# Patient Record
Sex: FEMALE | Race: BLACK OR AFRICAN AMERICAN | NOT HISPANIC OR LATINO | URBAN - METROPOLITAN AREA
[De-identification: names, ages, dates, MRNs, and addresses within clinical notes are randomized per-mention and may not be internally consistent; named-entity substitution may affect disease eponyms.]

---

## 2022-01-01 ENCOUNTER — OUTPATIENT (OUTPATIENT)
Dept: OUTPATIENT SERVICES | Facility: HOSPITAL | Age: 82
LOS: 1 days | Discharge: HOME | End: 2022-01-01

## 2022-01-01 ENCOUNTER — INPATIENT (INPATIENT)
Facility: HOSPITAL | Age: 82
LOS: 1 days | End: 2022-10-25
Attending: INTERNAL MEDICINE | Admitting: INTERNAL MEDICINE

## 2022-01-01 ENCOUNTER — INPATIENT (INPATIENT)
Facility: HOSPITAL | Age: 82
LOS: 3 days | Discharge: HOME | End: 2022-09-30
Attending: INTERNAL MEDICINE | Admitting: INTERNAL MEDICINE
Payer: COMMERCIAL

## 2022-01-01 VITALS — HEART RATE: 150 BPM | RESPIRATION RATE: 50 BRPM | WEIGHT: 89.95 LBS | HEIGHT: 60 IN | TEMPERATURE: 97 F

## 2022-01-01 VITALS
HEART RATE: 116 BPM | RESPIRATION RATE: 18 BRPM | TEMPERATURE: 98 F | DIASTOLIC BLOOD PRESSURE: 68 MMHG | OXYGEN SATURATION: 98 % | SYSTOLIC BLOOD PRESSURE: 104 MMHG

## 2022-01-01 VITALS
TEMPERATURE: 98 F | HEART RATE: 90 BPM | DIASTOLIC BLOOD PRESSURE: 57 MMHG | OXYGEN SATURATION: 98 % | RESPIRATION RATE: 20 BRPM | WEIGHT: 100.09 LBS | SYSTOLIC BLOOD PRESSURE: 87 MMHG | HEIGHT: 60 IN

## 2022-01-01 VITALS — RESPIRATION RATE: 30 BRPM | SYSTOLIC BLOOD PRESSURE: 45 MMHG

## 2022-01-01 DIAGNOSIS — I82.431 ACUTE EMBOLISM AND THROMBOSIS OF RIGHT POPLITEAL VEIN: ICD-10-CM

## 2022-01-01 DIAGNOSIS — I82.411 ACUTE EMBOLISM AND THROMBOSIS OF RIGHT FEMORAL VEIN: ICD-10-CM

## 2022-01-01 DIAGNOSIS — R53.81 OTHER MALAISE: ICD-10-CM

## 2022-01-01 DIAGNOSIS — Z23 ENCOUNTER FOR IMMUNIZATION: ICD-10-CM

## 2022-01-01 DIAGNOSIS — M89.8X8 OTHER SPECIFIED DISORDERS OF BONE, OTHER SITE: ICD-10-CM
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
## 2022-01-01 DIAGNOSIS — Z51.5 ENCOUNTER FOR PALLIATIVE CARE: ICD-10-CM

## 2022-01-01 DIAGNOSIS — M84.451D PATHOLOGICAL FRACTURE, RIGHT FEMUR, SUBSEQUENT ENCOUNTER FOR FRACTURE WITH ROUTINE HEALING: ICD-10-CM

## 2022-01-01 DIAGNOSIS — N93.9 ABNORMAL UTERINE AND VAGINAL BLEEDING, UNSPECIFIED: ICD-10-CM

## 2022-01-01 DIAGNOSIS — I10 ESSENTIAL (PRIMARY) HYPERTENSION: ICD-10-CM

## 2022-01-01 DIAGNOSIS — M79.661 PAIN IN RIGHT LOWER LEG: ICD-10-CM

## 2022-01-01 DIAGNOSIS — E43 UNSPECIFIED SEVERE PROTEIN-CALORIE MALNUTRITION: ICD-10-CM

## 2022-01-01 DIAGNOSIS — D63.8 ANEMIA IN OTHER CHRONIC DISEASES CLASSIFIED ELSEWHERE: ICD-10-CM

## 2022-01-01 DIAGNOSIS — R06.02 SHORTNESS OF BREATH: ICD-10-CM

## 2022-01-01 DIAGNOSIS — I26.99 OTHER PULMONARY EMBOLISM WITHOUT ACUTE COR PULMONALE: ICD-10-CM

## 2022-01-01 DIAGNOSIS — K59.00 CONSTIPATION, UNSPECIFIED: ICD-10-CM

## 2022-01-01 DIAGNOSIS — R62.7 ADULT FAILURE TO THRIVE: ICD-10-CM

## 2022-01-01 DIAGNOSIS — M17.11 UNILATERAL PRIMARY OSTEOARTHRITIS, RIGHT KNEE: ICD-10-CM

## 2022-01-01 LAB
24R-OH-CALCIDIOL SERPL-MCNC: 62.4 NG/ML — SIGNIFICANT CHANGE UP (ref 30–80)
ALBUMIN SERPL ELPH-MCNC: 0.9 G/DL — LOW (ref 3.3–5)
ALBUMIN SERPL ELPH-MCNC: 1.1 G/DL — LOW (ref 3.3–5)
ALBUMIN SERPL ELPH-MCNC: 1.3 G/DL — LOW (ref 3.3–5)
ALBUMIN SERPL ELPH-MCNC: 1.5 G/DL — LOW (ref 3.3–5)
ALP SERPL-CCNC: 109 U/L — SIGNIFICANT CHANGE UP (ref 40–120)
ALP SERPL-CCNC: 80 U/L — SIGNIFICANT CHANGE UP (ref 40–120)
ALP SERPL-CCNC: 92 U/L — SIGNIFICANT CHANGE UP (ref 40–120)
ALP SERPL-CCNC: 93 U/L — SIGNIFICANT CHANGE UP (ref 40–120)
ALT FLD-CCNC: 15 U/L — SIGNIFICANT CHANGE UP (ref 12–78)
ALT FLD-CCNC: 20 U/L — SIGNIFICANT CHANGE UP (ref 12–78)
ANION GAP SERPL CALC-SCNC: 4 MMOL/L — LOW (ref 5–17)
ANION GAP SERPL CALC-SCNC: 7 MMOL/L — SIGNIFICANT CHANGE UP (ref 5–17)
ANION GAP SERPL CALC-SCNC: 8 MMOL/L — SIGNIFICANT CHANGE UP (ref 5–17)
ANION GAP SERPL CALC-SCNC: 8 MMOL/L — SIGNIFICANT CHANGE UP (ref 5–17)
ANISOCYTOSIS BLD QL: SLIGHT — SIGNIFICANT CHANGE UP
APTT BLD: 30.2 SEC — SIGNIFICANT CHANGE UP (ref 27.5–35.5)
APTT BLD: 33.2 SEC — SIGNIFICANT CHANGE UP (ref 27.5–35.5)
APTT BLD: 34.2 SEC — SIGNIFICANT CHANGE UP (ref 27.5–35.5)
APTT BLD: 51.9 SEC — HIGH (ref 27.5–35.5)
APTT BLD: 56.4 SEC — HIGH (ref 27.5–35.5)
APTT BLD: 65.7 SEC — HIGH (ref 27.5–35.5)
APTT BLD: 72.9 SEC — HIGH (ref 27.5–35.5)
AST SERPL-CCNC: 19 U/L — SIGNIFICANT CHANGE UP (ref 15–37)
AST SERPL-CCNC: 19 U/L — SIGNIFICANT CHANGE UP (ref 15–37)
AST SERPL-CCNC: 21 U/L — SIGNIFICANT CHANGE UP (ref 15–37)
AST SERPL-CCNC: 26 U/L — SIGNIFICANT CHANGE UP (ref 15–37)
BASOPHILS # BLD AUTO: 0 K/UL — SIGNIFICANT CHANGE UP (ref 0–0.2)
BASOPHILS # BLD AUTO: 0.03 K/UL — SIGNIFICANT CHANGE UP (ref 0–0.2)
BASOPHILS NFR BLD AUTO: 0 % — SIGNIFICANT CHANGE UP (ref 0–2)
BASOPHILS NFR BLD AUTO: 0.5 % — SIGNIFICANT CHANGE UP (ref 0–2)
BILIRUB SERPL-MCNC: 0.5 MG/DL — SIGNIFICANT CHANGE UP (ref 0.2–1.2)
BILIRUB SERPL-MCNC: 0.7 MG/DL — SIGNIFICANT CHANGE UP (ref 0.2–1.2)
BLD GP AB SCN SERPL QL: SIGNIFICANT CHANGE UP
BLD GP AB SCN SERPL QL: SIGNIFICANT CHANGE UP
BUN SERPL-MCNC: 11 MG/DL — SIGNIFICANT CHANGE UP (ref 7–23)
BUN SERPL-MCNC: 15 MG/DL — SIGNIFICANT CHANGE UP (ref 7–23)
BUN SERPL-MCNC: 38 MG/DL — HIGH (ref 7–23)
BUN SERPL-MCNC: 44 MG/DL — HIGH (ref 7–23)
BURR CELLS BLD QL SMEAR: PRESENT — SIGNIFICANT CHANGE UP
CALCIUM SERPL-MCNC: 7.7 MG/DL — LOW (ref 8.5–10.1)
CALCIUM SERPL-MCNC: 8 MG/DL — LOW (ref 8.5–10.1)
CALCIUM SERPL-MCNC: 8.4 MG/DL — LOW (ref 8.5–10.1)
CALCIUM SERPL-MCNC: 8.7 MG/DL — SIGNIFICANT CHANGE UP (ref 8.5–10.1)
CHLORIDE SERPL-SCNC: 108 MMOL/L — SIGNIFICANT CHANGE UP (ref 96–108)
CHLORIDE SERPL-SCNC: 111 MMOL/L — HIGH (ref 96–108)
CHLORIDE SERPL-SCNC: 120 MMOL/L — HIGH (ref 96–108)
CHLORIDE SERPL-SCNC: 125 MMOL/L — HIGH (ref 96–108)
CO2 SERPL-SCNC: 21 MMOL/L — LOW (ref 22–31)
CO2 SERPL-SCNC: 24 MMOL/L — SIGNIFICANT CHANGE UP (ref 22–31)
CO2 SERPL-SCNC: 26 MMOL/L — SIGNIFICANT CHANGE UP (ref 22–31)
CO2 SERPL-SCNC: 30 MMOL/L — SIGNIFICANT CHANGE UP (ref 22–31)
CREAT SERPL-MCNC: 0.56 MG/DL — SIGNIFICANT CHANGE UP (ref 0.5–1.3)
CREAT SERPL-MCNC: 0.67 MG/DL — SIGNIFICANT CHANGE UP (ref 0.5–1.3)
CREAT SERPL-MCNC: 0.72 MG/DL — SIGNIFICANT CHANGE UP (ref 0.5–1.3)
CREAT SERPL-MCNC: 0.78 MG/DL — SIGNIFICANT CHANGE UP (ref 0.5–1.3)
EGFR: 75 ML/MIN/1.73M2 — SIGNIFICANT CHANGE UP
EGFR: 82 ML/MIN/1.73M2 — SIGNIFICANT CHANGE UP
EGFR: 86 ML/MIN/1.73M2 — SIGNIFICANT CHANGE UP
EGFR: 90 ML/MIN/1.73M2 — SIGNIFICANT CHANGE UP
EOSINOPHIL # BLD AUTO: 0 K/UL — SIGNIFICANT CHANGE UP (ref 0–0.5)
EOSINOPHIL # BLD AUTO: 0.02 K/UL — SIGNIFICANT CHANGE UP (ref 0–0.5)
EOSINOPHIL NFR BLD AUTO: 0 % — SIGNIFICANT CHANGE UP (ref 0–6)
EOSINOPHIL NFR BLD AUTO: 0.3 % — SIGNIFICANT CHANGE UP (ref 0–6)
FLUAV AG NPH QL: SIGNIFICANT CHANGE UP
FLUBV AG NPH QL: SIGNIFICANT CHANGE UP
FOLATE SERPL-MCNC: 12.6 NG/ML — SIGNIFICANT CHANGE UP
GLUCOSE BLDC GLUCOMTR-MCNC: 97 MG/DL — SIGNIFICANT CHANGE UP (ref 70–99)
GLUCOSE SERPL-MCNC: 100 MG/DL — HIGH (ref 70–99)
GLUCOSE SERPL-MCNC: 129 MG/DL — HIGH (ref 70–99)
GLUCOSE SERPL-MCNC: 139 MG/DL — HIGH (ref 70–99)
GLUCOSE SERPL-MCNC: 64 MG/DL — LOW (ref 70–99)
HCT VFR BLD CALC: 20.3 % — CRITICAL LOW (ref 34.5–45)
HCT VFR BLD CALC: 21.4 % — LOW (ref 34.5–45)
HCT VFR BLD CALC: 27.6 % — LOW (ref 34.5–45)
HCT VFR BLD CALC: 27.8 % — LOW (ref 34.5–45)
HCT VFR BLD CALC: 29 % — LOW (ref 34.5–45)
HCT VFR BLD CALC: 29.1 % — LOW (ref 34.5–45)
HCT VFR BLD CALC: 30.5 % — LOW (ref 34.5–45)
HCT VFR BLD CALC: 31.2 % — LOW (ref 34.5–45)
HCT VFR BLD CALC: 33.3 % — LOW (ref 34.5–45)
HGB BLD-MCNC: 10.2 G/DL — LOW (ref 11.5–15.5)
HGB BLD-MCNC: 6.3 G/DL — CRITICAL LOW (ref 11.5–15.5)
HGB BLD-MCNC: 6.4 G/DL — CRITICAL LOW (ref 11.5–15.5)
HGB BLD-MCNC: 8.6 G/DL — LOW (ref 11.5–15.5)
HGB BLD-MCNC: 8.7 G/DL — LOW (ref 11.5–15.5)
HGB BLD-MCNC: 9 G/DL — LOW (ref 11.5–15.5)
HGB BLD-MCNC: 9.1 G/DL — LOW (ref 11.5–15.5)
HGB BLD-MCNC: 9.6 G/DL — LOW (ref 11.5–15.5)
HGB BLD-MCNC: 9.7 G/DL — LOW (ref 11.5–15.5)
IMM GRANULOCYTES NFR BLD AUTO: 0.5 % — SIGNIFICANT CHANGE UP (ref 0–0.9)
INR BLD: 1.14 RATIO — SIGNIFICANT CHANGE UP (ref 0.88–1.16)
INR BLD: 1.67 RATIO — HIGH (ref 0.88–1.16)
IRON SATN MFR SERPL: 18 % — SIGNIFICANT CHANGE UP (ref 14–50)
IRON SATN MFR SERPL: 19 UG/DL — LOW (ref 30–160)
LDH SERPL L TO P-CCNC: 285 U/L — HIGH (ref 50–242)
LYMPHOCYTES # BLD AUTO: 1.74 K/UL — SIGNIFICANT CHANGE UP (ref 1–3.3)
LYMPHOCYTES # BLD AUTO: 15 % — SIGNIFICANT CHANGE UP (ref 13–44)
LYMPHOCYTES # BLD AUTO: 2.07 K/UL — SIGNIFICANT CHANGE UP (ref 1–3.3)
LYMPHOCYTES # BLD AUTO: 29 % — SIGNIFICANT CHANGE UP (ref 13–44)
MACROCYTES BLD QL: SLIGHT — SIGNIFICANT CHANGE UP
MAGNESIUM SERPL-MCNC: 2 MG/DL — SIGNIFICANT CHANGE UP (ref 1.6–2.6)
MANUAL SMEAR VERIFICATION: SIGNIFICANT CHANGE UP
MCHC RBC-ENTMCNC: 28.5 PG — SIGNIFICANT CHANGE UP (ref 27–34)
MCHC RBC-ENTMCNC: 28.7 PG — SIGNIFICANT CHANGE UP (ref 27–34)
MCHC RBC-ENTMCNC: 28.7 PG — SIGNIFICANT CHANGE UP (ref 27–34)
MCHC RBC-ENTMCNC: 28.8 PG — SIGNIFICANT CHANGE UP (ref 27–34)
MCHC RBC-ENTMCNC: 29 PG — SIGNIFICANT CHANGE UP (ref 27–34)
MCHC RBC-ENTMCNC: 29 PG — SIGNIFICANT CHANGE UP (ref 27–34)
MCHC RBC-ENTMCNC: 29.1 PG — SIGNIFICANT CHANGE UP (ref 27–34)
MCHC RBC-ENTMCNC: 29.1 PG — SIGNIFICANT CHANGE UP (ref 27–34)
MCHC RBC-ENTMCNC: 29.4 PG — SIGNIFICANT CHANGE UP (ref 27–34)
MCHC RBC-ENTMCNC: 29.9 G/DL — LOW (ref 32–36)
MCHC RBC-ENTMCNC: 30.6 G/DL — LOW (ref 32–36)
MCHC RBC-ENTMCNC: 30.8 G/DL — LOW (ref 32–36)
MCHC RBC-ENTMCNC: 30.9 G/DL — LOW (ref 32–36)
MCHC RBC-ENTMCNC: 31 G/DL — LOW (ref 32–36)
MCHC RBC-ENTMCNC: 31 G/DL — LOW (ref 32–36)
MCHC RBC-ENTMCNC: 31.3 G/DL — LOW (ref 32–36)
MCHC RBC-ENTMCNC: 31.5 G/DL — LOW (ref 32–36)
MCHC RBC-ENTMCNC: 31.8 G/DL — LOW (ref 32–36)
MCV RBC AUTO: 92.1 FL — SIGNIFICANT CHANGE UP (ref 80–100)
MCV RBC AUTO: 92.3 FL — SIGNIFICANT CHANGE UP (ref 80–100)
MCV RBC AUTO: 92.4 FL — SIGNIFICANT CHANGE UP (ref 80–100)
MCV RBC AUTO: 92.4 FL — SIGNIFICANT CHANGE UP (ref 80–100)
MCV RBC AUTO: 93 FL — SIGNIFICANT CHANGE UP (ref 80–100)
MCV RBC AUTO: 93.5 FL — SIGNIFICANT CHANGE UP (ref 80–100)
MCV RBC AUTO: 93.7 FL — SIGNIFICANT CHANGE UP (ref 80–100)
MCV RBC AUTO: 93.8 FL — SIGNIFICANT CHANGE UP (ref 80–100)
MCV RBC AUTO: 96.8 FL — SIGNIFICANT CHANGE UP (ref 80–100)
MICROCYTES BLD QL: SLIGHT — SIGNIFICANT CHANGE UP
MONOCYTES # BLD AUTO: 0.28 K/UL — SIGNIFICANT CHANGE UP (ref 0–0.9)
MONOCYTES # BLD AUTO: 0.58 K/UL — SIGNIFICANT CHANGE UP (ref 0–0.9)
MONOCYTES NFR BLD AUTO: 2 % — SIGNIFICANT CHANGE UP (ref 2–14)
MONOCYTES NFR BLD AUTO: 9.7 % — SIGNIFICANT CHANGE UP (ref 2–14)
NEUTROPHILS # BLD AUTO: 11.45 K/UL — HIGH (ref 1.8–7.4)
NEUTROPHILS # BLD AUTO: 3.6 K/UL — SIGNIFICANT CHANGE UP (ref 1.8–7.4)
NEUTROPHILS NFR BLD AUTO: 60 % — SIGNIFICANT CHANGE UP (ref 43–77)
NEUTROPHILS NFR BLD AUTO: 74 % — SIGNIFICANT CHANGE UP (ref 43–77)
NEUTS BAND # BLD: 9 % — HIGH (ref 0–8)
NRBC # BLD: 0 /100 WBCS — SIGNIFICANT CHANGE UP (ref 0–0)
NRBC # BLD: 0 /100 — SIGNIFICANT CHANGE UP (ref 0–0)
NRBC # BLD: SIGNIFICANT CHANGE UP /100 WBCS (ref 0–0)
OVALOCYTES BLD QL SMEAR: SLIGHT — SIGNIFICANT CHANGE UP
PHOSPHATE SERPL-MCNC: 2.7 MG/DL — SIGNIFICANT CHANGE UP (ref 2.5–4.5)
PLAT MORPH BLD: NORMAL — SIGNIFICANT CHANGE UP
PLATELET # BLD AUTO: 122 K/UL — LOW (ref 150–400)
PLATELET # BLD AUTO: 168 K/UL — SIGNIFICANT CHANGE UP (ref 150–400)
PLATELET # BLD AUTO: 174 K/UL — SIGNIFICANT CHANGE UP (ref 150–400)
PLATELET # BLD AUTO: 176 K/UL — SIGNIFICANT CHANGE UP (ref 150–400)
PLATELET # BLD AUTO: 178 K/UL — SIGNIFICANT CHANGE UP (ref 150–400)
PLATELET # BLD AUTO: 182 K/UL — SIGNIFICANT CHANGE UP (ref 150–400)
PLATELET # BLD AUTO: 184 K/UL — SIGNIFICANT CHANGE UP (ref 150–400)
PLATELET # BLD AUTO: 195 K/UL — SIGNIFICANT CHANGE UP (ref 150–400)
PLATELET # BLD AUTO: 99 K/UL — LOW (ref 150–400)
PLATELET CLUMP BLD QL SMEAR: SLIGHT
POIKILOCYTOSIS BLD QL AUTO: SLIGHT — SIGNIFICANT CHANGE UP
POLYCHROMASIA BLD QL SMEAR: SLIGHT — SIGNIFICANT CHANGE UP
POTASSIUM SERPL-MCNC: 3.6 MMOL/L — SIGNIFICANT CHANGE UP (ref 3.5–5.3)
POTASSIUM SERPL-MCNC: 3.9 MMOL/L — SIGNIFICANT CHANGE UP (ref 3.5–5.3)
POTASSIUM SERPL-MCNC: 4.1 MMOL/L — SIGNIFICANT CHANGE UP (ref 3.5–5.3)
POTASSIUM SERPL-MCNC: 4.3 MMOL/L — SIGNIFICANT CHANGE UP (ref 3.5–5.3)
POTASSIUM SERPL-SCNC: 3.6 MMOL/L — SIGNIFICANT CHANGE UP (ref 3.5–5.3)
POTASSIUM SERPL-SCNC: 3.9 MMOL/L — SIGNIFICANT CHANGE UP (ref 3.5–5.3)
POTASSIUM SERPL-SCNC: 4.1 MMOL/L — SIGNIFICANT CHANGE UP (ref 3.5–5.3)
POTASSIUM SERPL-SCNC: 4.3 MMOL/L — SIGNIFICANT CHANGE UP (ref 3.5–5.3)
PROT SERPL-MCNC: 4.1 GM/DL — LOW (ref 6–8.3)
PROT SERPL-MCNC: 5.2 GM/DL — LOW (ref 6–8.3)
PROT SERPL-MCNC: 5.5 GM/DL — LOW (ref 6–8.3)
PROT SERPL-MCNC: 6.6 GM/DL — SIGNIFICANT CHANGE UP (ref 6–8.3)
PROTHROM AB SERPL-ACNC: 13.6 SEC — HIGH (ref 10.5–13.4)
PROTHROM AB SERPL-ACNC: 20 SEC — HIGH (ref 10.5–13.4)
RBC # BLD: 2.17 M/UL — LOW (ref 3.8–5.2)
RBC # BLD: 2.21 M/UL — LOW (ref 3.8–5.2)
RBC # BLD: 2.99 M/UL — LOW (ref 3.8–5.2)
RBC # BLD: 3.02 M/UL — LOW (ref 3.8–5.2)
RBC # BLD: 3.12 M/UL — LOW (ref 3.8–5.2)
RBC # BLD: 3.13 M/UL — LOW (ref 3.8–5.2)
RBC # BLD: 3.14 M/UL — LOW (ref 3.8–5.2)
RBC # BLD: 3.3 M/UL — LOW (ref 3.8–5.2)
RBC # BLD: 3.33 M/UL — LOW (ref 3.8–5.2)
RBC # BLD: 3.55 M/UL — LOW (ref 3.8–5.2)
RBC # FLD: 15.1 % — HIGH (ref 10.3–14.5)
RBC # FLD: 15.2 % — HIGH (ref 10.3–14.5)
RBC # FLD: 16.4 % — HIGH (ref 10.3–14.5)
RBC # FLD: 16.8 % — HIGH (ref 10.3–14.5)
RBC BLD AUTO: ABNORMAL
RETICS #: 61.2 K/UL — SIGNIFICANT CHANGE UP (ref 25–125)
RETICS/RBC NFR: 2 % — SIGNIFICANT CHANGE UP (ref 0.5–2.5)
SARS-COV-2 RNA SPEC QL NAA+PROBE: SIGNIFICANT CHANGE UP
SODIUM SERPL-SCNC: 142 MMOL/L — SIGNIFICANT CHANGE UP (ref 135–145)
SODIUM SERPL-SCNC: 144 MMOL/L — SIGNIFICANT CHANGE UP (ref 135–145)
SODIUM SERPL-SCNC: 152 MMOL/L — HIGH (ref 135–145)
SODIUM SERPL-SCNC: 154 MMOL/L — HIGH (ref 135–145)
TIBC SERPL-MCNC: 103 UG/DL — LOW (ref 220–430)
TOXIC GRANULES BLD QL SMEAR: PRESENT — SIGNIFICANT CHANGE UP
UIBC SERPL-MCNC: 85 UG/DL — LOW (ref 110–370)
VIT B12 SERPL-MCNC: 1211 PG/ML — SIGNIFICANT CHANGE UP (ref 232–1245)
WBC # BLD: 11.15 K/UL — HIGH (ref 3.8–10.5)
WBC # BLD: 13.79 K/UL — HIGH (ref 3.8–10.5)
WBC # BLD: 16.11 K/UL — HIGH (ref 3.8–10.5)
WBC # BLD: 5.97 K/UL — SIGNIFICANT CHANGE UP (ref 3.8–10.5)
WBC # BLD: 6 K/UL — SIGNIFICANT CHANGE UP (ref 3.8–10.5)
WBC # BLD: 6.23 K/UL — SIGNIFICANT CHANGE UP (ref 3.8–10.5)
WBC # BLD: 7.06 K/UL — SIGNIFICANT CHANGE UP (ref 3.8–10.5)
WBC # BLD: 7.6 K/UL — SIGNIFICANT CHANGE UP (ref 3.8–10.5)
WBC # BLD: 8.12 K/UL — SIGNIFICANT CHANGE UP (ref 3.8–10.5)
WBC # FLD AUTO: 11.15 K/UL — HIGH (ref 3.8–10.5)
WBC # FLD AUTO: 13.79 K/UL — HIGH (ref 3.8–10.5)
WBC # FLD AUTO: 16.11 K/UL — HIGH (ref 3.8–10.5)
WBC # FLD AUTO: 5.97 K/UL — SIGNIFICANT CHANGE UP (ref 3.8–10.5)
WBC # FLD AUTO: 6 K/UL — SIGNIFICANT CHANGE UP (ref 3.8–10.5)
WBC # FLD AUTO: 6.23 K/UL — SIGNIFICANT CHANGE UP (ref 3.8–10.5)
WBC # FLD AUTO: 7.06 K/UL — SIGNIFICANT CHANGE UP (ref 3.8–10.5)
WBC # FLD AUTO: 7.6 K/UL — SIGNIFICANT CHANGE UP (ref 3.8–10.5)
WBC # FLD AUTO: 8.12 K/UL — SIGNIFICANT CHANGE UP (ref 3.8–10.5)

## 2022-01-01 PROCEDURE — 71045 X-RAY EXAM CHEST 1 VIEW: CPT | Mod: 26

## 2022-01-01 PROCEDURE — 93010 ELECTROCARDIOGRAM REPORT: CPT

## 2022-01-01 PROCEDURE — 74177 CT ABD & PELVIS W/CONTRAST: CPT | Mod: 26,MA

## 2022-01-01 PROCEDURE — 73564 X-RAY EXAM KNEE 4 OR MORE: CPT | Mod: 26,RT

## 2022-01-01 PROCEDURE — 73502 X-RAY EXAM HIP UNI 2-3 VIEWS: CPT | Mod: 26,RT

## 2022-01-01 PROCEDURE — 99285 EMERGENCY DEPT VISIT HI MDM: CPT

## 2022-01-01 PROCEDURE — 99232 SBSQ HOSP IP/OBS MODERATE 35: CPT

## 2022-01-01 PROCEDURE — 73552 X-RAY EXAM OF FEMUR 2/>: CPT | Mod: 26,RT

## 2022-01-01 PROCEDURE — 99223 1ST HOSP IP/OBS HIGH 75: CPT

## 2022-01-01 PROCEDURE — 99497 ADVNCD CARE PLAN 30 MIN: CPT | Mod: 25

## 2022-01-01 PROCEDURE — 99233 SBSQ HOSP IP/OBS HIGH 50: CPT

## 2022-01-01 PROCEDURE — 99239 HOSP IP/OBS DSCHRG MGMT >30: CPT

## 2022-01-01 RX ORDER — SODIUM CHLORIDE 9 MG/ML
1000 INJECTION INTRAMUSCULAR; INTRAVENOUS; SUBCUTANEOUS ONCE
Refills: 0 | Status: DISCONTINUED | OUTPATIENT
Start: 2022-01-01 | End: 2022-01-01

## 2022-01-01 RX ORDER — BNT162B2 ORIGINAL AND OMICRON BA.4/BA.5 .1125; .1125 MG/2.25ML; MG/2.25ML
0.3 INJECTION, SUSPENSION INTRAMUSCULAR ONCE
Refills: 0 | Status: COMPLETED | OUTPATIENT
Start: 2022-01-01 | End: 2022-01-01

## 2022-01-01 RX ORDER — ACETAMINOPHEN 500 MG
600 TABLET ORAL ONCE
Refills: 0 | Status: COMPLETED | OUTPATIENT
Start: 2022-01-01 | End: 2022-01-01

## 2022-01-01 RX ORDER — MORPHINE SULFATE 50 MG/1
2 CAPSULE, EXTENDED RELEASE ORAL EVERY 4 HOURS
Refills: 0 | Status: DISCONTINUED | OUTPATIENT
Start: 2022-01-01 | End: 2022-01-01

## 2022-01-01 RX ORDER — ALBUMIN HUMAN 25 %
50 VIAL (ML) INTRAVENOUS ONCE
Refills: 0 | Status: COMPLETED | OUTPATIENT
Start: 2022-01-01 | End: 2022-01-01

## 2022-01-01 RX ORDER — HEPARIN SODIUM 5000 [USP'U]/ML
INJECTION INTRAVENOUS; SUBCUTANEOUS
Qty: 25000 | Refills: 0 | Status: DISCONTINUED | OUTPATIENT
Start: 2022-01-01 | End: 2022-01-01

## 2022-01-01 RX ORDER — PROTHROMBIN COMPLEX CONCENTRATE (HUMAN) 25.5; 16.5; 24; 22; 22; 26 [IU]/ML; [IU]/ML; [IU]/ML; [IU]/ML; [IU]/ML; [IU]/ML
2000 POWDER, FOR SOLUTION INTRAVENOUS ONCE
Refills: 0 | Status: COMPLETED | OUTPATIENT
Start: 2022-01-01 | End: 2022-01-01

## 2022-01-01 RX ORDER — SODIUM CHLORIDE 9 MG/ML
1000 INJECTION, SOLUTION INTRAVENOUS
Refills: 0 | Status: DISCONTINUED | OUTPATIENT
Start: 2022-01-01 | End: 2022-01-01

## 2022-01-01 RX ORDER — MORPHINE SULFATE 50 MG/1
2 CAPSULE, EXTENDED RELEASE ORAL ONCE
Refills: 0 | Status: DISCONTINUED | OUTPATIENT
Start: 2022-01-01 | End: 2022-01-01

## 2022-01-01 RX ORDER — SODIUM CHLORIDE 9 MG/ML
1000 INJECTION, SOLUTION INTRAVENOUS ONCE
Refills: 0 | Status: COMPLETED | OUTPATIENT
Start: 2022-01-01 | End: 2022-01-01

## 2022-01-01 RX ORDER — CALCIUM CARBONATE 500(1250)
1 TABLET ORAL
Qty: 0 | Refills: 0 | DISCHARGE
Start: 2022-01-01

## 2022-01-01 RX ORDER — ACETAMINOPHEN 500 MG
650 TABLET ORAL EVERY 6 HOURS
Refills: 0 | Status: DISCONTINUED | OUTPATIENT
Start: 2022-01-01 | End: 2022-01-01

## 2022-01-01 RX ORDER — SODIUM CHLORIDE 9 MG/ML
1000 INJECTION INTRAMUSCULAR; INTRAVENOUS; SUBCUTANEOUS ONCE
Refills: 0 | Status: COMPLETED | OUTPATIENT
Start: 2022-01-01 | End: 2022-01-01

## 2022-01-01 RX ORDER — PROTHROMBIN COMPLEX CONCENTRATE (HUMAN) 25.5; 16.5; 24; 22; 22; 26 [IU]/ML; [IU]/ML; [IU]/ML; [IU]/ML; [IU]/ML; [IU]/ML
2000 POWDER, FOR SOLUTION INTRAVENOUS ONCE
Refills: 0 | Status: DISCONTINUED | OUTPATIENT
Start: 2022-01-01 | End: 2022-01-01

## 2022-01-01 RX ORDER — HEPARIN SODIUM 5000 [USP'U]/ML
3500 INJECTION INTRAVENOUS; SUBCUTANEOUS EVERY 6 HOURS
Refills: 0 | Status: DISCONTINUED | OUTPATIENT
Start: 2022-01-01 | End: 2022-01-01

## 2022-01-01 RX ORDER — HEPARIN SODIUM 5000 [USP'U]/ML
1500 INJECTION INTRAVENOUS; SUBCUTANEOUS EVERY 6 HOURS
Refills: 0 | Status: DISCONTINUED | OUTPATIENT
Start: 2022-01-01 | End: 2022-01-01

## 2022-01-01 RX ORDER — HEPARIN SODIUM 5000 [USP'U]/ML
3500 INJECTION INTRAVENOUS; SUBCUTANEOUS ONCE
Refills: 0 | Status: COMPLETED | OUTPATIENT
Start: 2022-01-01 | End: 2022-01-01

## 2022-01-01 RX ORDER — RIVAROXABAN 15 MG-20MG
15 KIT ORAL
Refills: 0 | Status: DISCONTINUED | OUTPATIENT
Start: 2022-01-01 | End: 2022-01-01

## 2022-01-01 RX ORDER — CALCIUM CARBONATE 500(1250)
1 TABLET ORAL
Refills: 0 | Status: DISCONTINUED | OUTPATIENT
Start: 2022-01-01 | End: 2022-01-01

## 2022-01-01 RX ORDER — RIVAROXABAN 15 MG-20MG
1 KIT ORAL
Qty: 0 | Refills: 0 | DISCHARGE
Start: 2022-01-01

## 2022-01-01 RX ORDER — ROBINUL 0.2 MG/ML
0.4 INJECTION INTRAMUSCULAR; INTRAVENOUS EVERY 6 HOURS
Refills: 0 | Status: DISCONTINUED | OUTPATIENT
Start: 2022-01-01 | End: 2022-01-01

## 2022-01-01 RX ORDER — ACETAMINOPHEN 500 MG
2 TABLET ORAL
Qty: 0 | Refills: 0 | DISCHARGE
Start: 2022-01-01

## 2022-01-01 RX ORDER — SODIUM CHLORIDE 9 MG/ML
2000 INJECTION INTRAMUSCULAR; INTRAVENOUS; SUBCUTANEOUS ONCE
Refills: 0 | Status: COMPLETED | OUTPATIENT
Start: 2022-01-01 | End: 2022-01-01

## 2022-01-01 RX ADMIN — HEPARIN SODIUM 800 UNIT(S)/HR: 5000 INJECTION INTRAVENOUS; SUBCUTANEOUS at 19:26

## 2022-01-01 RX ADMIN — RIVAROXABAN 15 MILLIGRAM(S): KIT at 07:35

## 2022-01-01 RX ADMIN — Medication 1 TABLET(S): at 17:36

## 2022-01-01 RX ADMIN — Medication 240 MILLIGRAM(S): at 05:06

## 2022-01-01 RX ADMIN — Medication 1 TABLET(S): at 06:47

## 2022-01-01 RX ADMIN — SODIUM CHLORIDE 2000 MILLILITER(S): 9 INJECTION INTRAMUSCULAR; INTRAVENOUS; SUBCUTANEOUS at 07:00

## 2022-01-01 RX ADMIN — HEPARIN SODIUM 800 UNIT(S)/HR: 5000 INJECTION INTRAVENOUS; SUBCUTANEOUS at 07:06

## 2022-01-01 RX ADMIN — SODIUM CHLORIDE 100 MILLILITER(S): 9 INJECTION, SOLUTION INTRAVENOUS at 17:42

## 2022-01-01 RX ADMIN — Medication 1 TABLET(S): at 12:12

## 2022-01-01 RX ADMIN — RIVAROXABAN 15 MILLIGRAM(S): KIT at 17:36

## 2022-01-01 RX ADMIN — SODIUM CHLORIDE 2000 MILLILITER(S): 9 INJECTION INTRAMUSCULAR; INTRAVENOUS; SUBCUTANEOUS at 05:15

## 2022-01-01 RX ADMIN — Medication 1 TABLET(S): at 17:07

## 2022-01-01 RX ADMIN — Medication 1 TABLET(S): at 05:38

## 2022-01-01 RX ADMIN — HEPARIN SODIUM 800 UNIT(S)/HR: 5000 INJECTION INTRAVENOUS; SUBCUTANEOUS at 21:17

## 2022-01-01 RX ADMIN — HEPARIN SODIUM 3500 UNIT(S): 5000 INJECTION INTRAVENOUS; SUBCUTANEOUS at 17:23

## 2022-01-01 RX ADMIN — BNT162B2 ORIGINAL AND OMICRON BA.4/BA.5 0.3 MILLILITER(S): .1125; .1125 INJECTION, SUSPENSION INTRAMUSCULAR at 14:36

## 2022-01-01 RX ADMIN — Medication 1 TABLET(S): at 11:50

## 2022-01-01 RX ADMIN — SODIUM CHLORIDE 1000 MILLILITER(S): 9 INJECTION INTRAMUSCULAR; INTRAVENOUS; SUBCUTANEOUS at 08:50

## 2022-01-01 RX ADMIN — SODIUM CHLORIDE 100 MILLILITER(S): 9 INJECTION, SOLUTION INTRAVENOUS at 06:26

## 2022-01-01 RX ADMIN — RIVAROXABAN 15 MILLIGRAM(S): KIT at 08:16

## 2022-01-01 RX ADMIN — MORPHINE SULFATE 2 MILLIGRAM(S): 50 CAPSULE, EXTENDED RELEASE ORAL at 08:00

## 2022-01-01 RX ADMIN — MORPHINE SULFATE 2 MILLIGRAM(S): 50 CAPSULE, EXTENDED RELEASE ORAL at 06:45

## 2022-01-01 RX ADMIN — Medication 50 MILLILITER(S): at 06:29

## 2022-01-01 RX ADMIN — HEPARIN SODIUM 800 UNIT(S)/HR: 5000 INJECTION INTRAVENOUS; SUBCUTANEOUS at 07:27

## 2022-01-01 RX ADMIN — Medication 600 MILLIGRAM(S): at 05:30

## 2022-01-01 RX ADMIN — RIVAROXABAN 15 MILLIGRAM(S): KIT at 11:28

## 2022-01-01 RX ADMIN — SODIUM CHLORIDE 1000 MILLILITER(S): 9 INJECTION, SOLUTION INTRAVENOUS at 03:07

## 2022-01-01 RX ADMIN — Medication 1 TABLET(S): at 05:47

## 2022-01-01 RX ADMIN — HEPARIN SODIUM 800 UNIT(S)/HR: 5000 INJECTION INTRAVENOUS; SUBCUTANEOUS at 23:51

## 2022-01-01 RX ADMIN — RIVAROXABAN 15 MILLIGRAM(S): KIT at 17:07

## 2022-01-01 RX ADMIN — SODIUM CHLORIDE 1000 MILLILITER(S): 9 INJECTION INTRAMUSCULAR; INTRAVENOUS; SUBCUTANEOUS at 07:38

## 2022-01-01 RX ADMIN — HEPARIN SODIUM 800 UNIT(S)/HR: 5000 INJECTION INTRAVENOUS; SUBCUTANEOUS at 17:22

## 2022-01-01 RX ADMIN — SODIUM CHLORIDE 1000 MILLILITER(S): 9 INJECTION, SOLUTION INTRAVENOUS at 01:27

## 2022-01-01 RX ADMIN — HEPARIN SODIUM 800 UNIT(S)/HR: 5000 INJECTION INTRAVENOUS; SUBCUTANEOUS at 01:12

## 2022-01-01 RX ADMIN — HEPARIN SODIUM 900 UNIT(S)/HR: 5000 INJECTION INTRAVENOUS; SUBCUTANEOUS at 08:55

## 2022-01-01 RX ADMIN — SODIUM CHLORIDE 100 MILLILITER(S): 9 INJECTION, SOLUTION INTRAVENOUS at 05:37

## 2022-01-01 RX ADMIN — SODIUM CHLORIDE 75 MILLILITER(S): 9 INJECTION, SOLUTION INTRAVENOUS at 13:58

## 2022-01-01 RX ADMIN — MORPHINE SULFATE 2 MILLIGRAM(S): 50 CAPSULE, EXTENDED RELEASE ORAL at 15:13

## 2022-01-01 RX ADMIN — PROTHROMBIN COMPLEX CONCENTRATE (HUMAN) 400 INTERNATIONAL UNIT(S): 25.5; 16.5; 24; 22; 22; 26 POWDER, FOR SOLUTION INTRAVENOUS at 07:34

## 2022-01-01 RX ADMIN — Medication 1 TABLET(S): at 11:28

## 2022-01-01 RX ADMIN — PROTHROMBIN COMPLEX CONCENTRATE (HUMAN) 2000 INTERNATIONAL UNIT(S): 25.5; 16.5; 24; 22; 22; 26 POWDER, FOR SOLUTION INTRAVENOUS at 08:00
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
## 2022-09-26 NOTE — ED PROVIDER NOTE - CONSTITUTIONAL, MLM
Well appearing, awake, alert, oriented to person, place, time/situation and in no apparent distress unless R hip is moved normal...

## 2022-09-26 NOTE — ED ADULT TRIAGE NOTE - CHIEF COMPLAINT QUOTE
DVT in Right Leg, sent by [facility] Topical Retinoid counseling:  Patient advised to apply a pea-sized amount only at bedtime and wait 30 minutes after washing their face before applying.  If too drying, patient may add a non-comedogenic moisturizer. The patient verbalized understanding of the proper use and possible adverse effects of retinoids.  All of the patient's questions and concerns were addressed.

## 2022-09-26 NOTE — H&P ADULT - NSHPLABSRESULTS_GEN_ALL_CORE
LABS:                        10.2   6.00  )-----------( 182      ( 26 Sep 2022 14:10 )             33.3     09-26    142  |  108  |  15  ----------------------------<  64<L>  4.1   |  30  |  0.67    Ca    8.4<L>      26 Sep 2022 14:10    TPro  6.6  /  Alb  1.5<L>  /  TBili  0.5  /  DBili  x   /  AST  26  /  ALT  20  /  AlkPhos  109  09-26    PT/INR - ( 26 Sep 2022 14:10 )   PT: 13.6 sec;   INR: 1.14 ratio         PTT - ( 26 Sep 2022 14:10 )  PTT:33.2 sec        RADIOLOGY & ADDITIONAL TESTS:

## 2022-09-26 NOTE — PATIENT PROFILE ADULT - INTERNATIONAL TRAVEL
Extreme prematurity, vaginal delivery, and frontal bossing/prominance with bruising at delivery.    4/14 CUS normal but due to technique could not definitively rule out IVH or hydrocephalus.    4/19 and 5/14 CUS normal.   Plan: Follow clinically.    No

## 2022-09-26 NOTE — H&P ADULT - HISTORY OF PRESENT ILLNESS
82 year old female PMH of HTN but recently stopped meds due to hypotension presenting to ED due to R leg swelling without any CP or SOB. No fever/chills or syncope. Had R leg injury about 4 weeks ago in [facility] and had decreased mobility due to injury on R knee/hip area. Had only right knee x-ray in [place] that was negative as per family. Pt has not been able to walk well. Outpatient urgent care center found right  Femoral/popliteal area DVT on US. Admitted for AC.  82 year old female PMH of HTN but recently stopped meds due to hypotension presenting to ED due to R leg swelling without any CP or SOB. No fever/chills or syncope. Had R leg injury about 4 weeks ago while in [place] and had decreased mobility due to injury on R knee/hip area. Had only right knee x-ray in [place] that was negative as per family. Pt has not been able to walk well. Outpatient urgent care center found right  femoral/popliteal area DVT on US. Admitted for AC.

## 2022-09-26 NOTE — ED ADULT NURSE NOTE - NS_SISCREENINGSR_GEN_ALL_ED
Attempted to call patient with lab and CT results. No answer. Mailbox full. Will try again later.   
Negative

## 2022-09-26 NOTE — PATIENT PROFILE ADULT - FALL HARM RISK - HARM RISK INTERVENTIONS

## 2022-09-26 NOTE — ED PROVIDER NOTE - OBJECTIVE STATEMENT
82 year old female with PMH of HTN but recently stopped due to hypotension presenting to ED due to R leg swelling without any CP or SOB. No fever/chills and otherwise had R leg injury about 4 weeks ago and had decreased mobility due to injury on R knee/hip area, had xray in [place] that was negative as per family. Pt has not been able to walk well and noted to have R Femoral/popliteal area on US outpatient performed today.

## 2022-09-26 NOTE — H&P ADULT - ASSESSMENT
82 year old female PMH of HTN but recently stopped meds due to hypotension presenting to ED due to R leg swelling without any CP or SOB. No fever/chills or syncope. Had R leg injury about 4 weeks ago in [facility] and had decreased mobility due to injury on R knee/hip area. Had only right knee x-ray in [place] that was negative as per family. Pt has not been able to walk well. Outpatient urgent care center found right  Femoral/popliteal area DVT on US. Admitted for AC.     Plan:  82 year old female PMH of HTN but recently stopped meds due to hypotension presenting to ED due to R leg swelling without any CP or SOB. No fever/chills or syncope. Had R leg injury about 4 weeks ago in [facility] and had decreased mobility due to injury on R knee/hip area. Had only right knee x-ray in [place] that was negative as per family. Pt has not been able to walk well. Outpatient urgent care center found right  Femoral/popliteal area DVT on US. Admitted for AC.       Plan: Admit to medicine for prior right hip fracture and now acute DVT right leg. X-ray in ER confirms right hip non-displaced fracture. ER started IV Heparin. No SOB or CP. No GI or  bleeding. Does not eat as much as she should, Nutrition Consult for AM. Takes no routine meds at home, no prior medical problems. PT ordered.   Ortho plans non-operative management of right hip as fall is 4 weeks old. CXR is clear.   IVF for 24 hours as BP low on arrival.

## 2022-09-26 NOTE — H&P ADULT - NSHPPHYSICALEXAM_GEN_ALL_CORE
PHYSICAL EXAMINATION:  Vital Signs Last 24 Hrs  T(C): 36.3 (26 Sep 2022 16:05), Max: 36.6 (26 Sep 2022 13:41)  T(F): 97.3 (26 Sep 2022 16:05), Max: 97.9 (26 Sep 2022 13:41)  HR: 74 (26 Sep 2022 16:05) (74 - 90)  BP: 103/66 (26 Sep 2022 16:05) (87/57 - 103/66)  BP(mean): --  RR: 18 (26 Sep 2022 16:05) (18 - 20)  SpO2: 100% (26 Sep 2022 16:05) (98% - 100%)    Parameters below as of 26 Sep 2022 16:05  Patient On (Oxygen Delivery Method): room air      CAPILLARY BLOOD GLUCOSE          GENERAL: NAD,   ENMT: mucous membranes moist  NECK: supple, No JVD  CHEST/LUNG: clear to auscultation bilaterally; no rales, rhonchi, or wheezing b/l  HEART: normal S1, S2  ABDOMEN: BS+, soft, ND, NT   EXTREMITIES:  pulses palpable; no clubbing, cyanosis, or edema b/l LEs  NEURO: awake, alert, interactive; moves all extremities PHYSICAL EXAMINATION:  Vital Signs Last 24 Hrs  T(C): 36.3 (26 Sep 2022 16:05), Max: 36.6 (26 Sep 2022 13:41)  T(F): 97.3 (26 Sep 2022 16:05), Max: 97.9 (26 Sep 2022 13:41)  HR: 74 (26 Sep 2022 16:05) (74 - 90)  BP: 103/66 (26 Sep 2022 16:05) (87/57 - 103/66)  BP(mean): --  RR: 18 (26 Sep 2022 16:05) (18 - 20)  SpO2: 100% (26 Sep 2022 16:05) (98% - 100%)    Parameters below as of 26 Sep 2022 16:05  Patient On (Oxygen Delivery Method): room air      CAPILLARY BLOOD GLUCOSE          GENERAL: NAD, seen in ER, frail, answers all questions, no SOB, daughter at bedside   ENMT: mucous membranes moist  NECK: supple, No JVD  CHEST/LUNG: clear to auscultation bilaterally; no rales, rhonchi, or wheezing b/l  HEART: normal S1, S2  ABDOMEN: BS+, soft, ND, NT   EXTREMITIES:  pulses palpable; no clubbing, cyanosis, or edema b/l LEs  NEURO: awake, alert, interactive; moves all extremities

## 2022-09-26 NOTE — ED PROVIDER NOTE - CLINICAL SUMMARY MEDICAL DECISION MAKING FREE TEXT BOX
Pt with DVT noted - will medicate with heparin, pt with difficulty with R leg movement- will admit for rehab/PT as well Pt with DVT noted - will medicate with heparin, pt with difficulty with R leg movement- will admit for rehab/PT as well and noted R hip fracture - 4 weeks old - ortho called and will evaluate.

## 2022-09-26 NOTE — ED ADULT NURSE NOTE - OBJECTIVE STATEMENT
as per patient's daughter " she fell month ago, and injured her right lower leg, she has been doing physical therapy, refuses to go to doctor. Went for MRI / doppler done last week, found a blood clot in left lower leg" [ Noted swelling discoloration to right knee and outward rotation to right lower leg]

## 2022-09-26 NOTE — ED PROVIDER NOTE - MUSCULOSKELETAL, MLM
Spine appears normal, range of motion is not limited, R leg with swelling nonpitting, ROM limited on R hip and knee secondary to pain

## 2022-09-26 NOTE — ED PROVIDER NOTE - CARE PLAN
1 Principal Discharge DX:	Acute deep vein thrombosis (DVT) of right lower extremity   Principal Discharge DX:	Acute deep vein thrombosis (DVT) of right lower extremity  Secondary Diagnosis:	Fracture of right hip

## 2022-09-28 NOTE — PHYSICAL THERAPY INITIAL EVALUATION ADULT - GENERAL OBSERVATIONS, REHAB EVAL
Pt found supine with HOB elevated, +heparin drip IV, R LE held in flexion/external rotation, fear of falling and PT provided reassurance/safety cues, cooperative, stuttering speech. Pt found supine with HOB elevated, OT Barbara present for safe pt handling +heparin drip IV, R LE held in flexion/external rotation, fear of falling and PT provided reassurance/safety cues, cooperative, stuttering speech.

## 2022-09-28 NOTE — OCCUPATIONAL THERAPY INITIAL EVALUATION ADULT - SOCIAL CONCERNS
Pt voiced concerns about her recovery  and the loss of ability to ambulate and assist with caring for herself./Complex psychosocial needs/coping issues

## 2022-09-28 NOTE — OCCUPATIONAL THERAPY INITIAL EVALUATION ADULT - LIVES WITH, PROFILE
her daughter in a private house with 6 entry in the front equipped with bilateral hand rails that cannot be reached simultaneously. Pt generally enters from the side which hand 3 steps, but no hand-rail. All living amenities are located on one level. The bathroom has a tub/shower combination and standard toilet, shower chair, fixed shower head and standard toilet. her daughter in a private house with 6 entry steps  in the front equipped with bilateral hand rails that cannot be reached simultaneously. Pt generally enters from the side which hand 3 steps, but no hand-rail. All living amenities are located on one level. The bathroom has a tub/shower combination and standard toilet, shower chair, fixed shower head and standard toilet.

## 2022-09-28 NOTE — PHYSICAL THERAPY INITIAL EVALUATION ADULT - RANGE OF MOTION EXAMINATION, REHAB EVAL
R hip and knee grossly 90 degrees in short sit at EOB/bilateral upper extremity ROM was WFL (within functional limits)/bilateral lower extremity ROM was WFL (within functional limits)

## 2022-09-28 NOTE — DIETITIAN NUTRITION RISK NOTIFICATION - TREATMENT: THE FOLLOWING DIET HAS BEEN RECOMMENDED
Diet, Pureed:   Supplement Feeding Modality:  Oral  Ensure Clear Cans or Servings Per Day:  1       Frequency:  Three Times a day (09-28-22 @ 10:36) [Pending Verification By Attending]  Diet, Regular (09-26-22 @ 17:35) [Active]

## 2022-09-28 NOTE — DIETITIAN INITIAL EVALUATION ADULT - PERTINENT MEDS FT
MEDICATIONS  (STANDING):  calcium carbonate   1250 mG (OsCal) 1 Tablet(s) Oral two times a day  multivitamin 1 Tablet(s) Oral daily  rivaroxaban 15 milliGRAM(s) Oral two times a day with meals    MEDICATIONS  (PRN):  acetaminophen     Tablet .. 650 milliGRAM(s) Oral every 6 hours PRN Mild Pain (1 - 3), Moderate Pain (4 - 6)

## 2022-09-28 NOTE — OCCUPATIONAL THERAPY INITIAL EVALUATION ADULT - ADDITIONAL COMMENTS
Prior to admission, Pt was functioning in ambulating independently in door with a rolling walker , but uses a w/c in the community. Presently, pt needs more assistance with functional mobility  lower body self care tasks due to pain, weakness, stiffness and decreased ROM from right hip fracture. Pt has difficulty with propulsion of right  LE due to pain weakness and decreased ROM.  Pt is right hand dominant and wears glasses for reading. Prior to admission, pt was functioning in ambulating independently indoors with a rolling walker , but uses a w/c in the community. Presently, pt needs more assistance with functional mobility lower body self care tasks due to pain, weakness, stiffness and decreased ROM from right hip fracture. Pt has difficulty with propulsion of right LE due to pain weakness and decreased ROM.  Pt is right hand dominant and wears glasses for reading.

## 2022-09-28 NOTE — OCCUPATIONAL THERAPY INITIAL EVALUATION ADULT - REHAB POTENTIAL, OT EVAL
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   Uses remote monitoring.     Incision well healed.  No pocket discomfort.     Normal device function.   Battery voltage, sensing, and thresholds are normal.   Lead impedances are stable.     Battery longevity:     Presenting rhythm:   Underlying rhythm:     Percent paced: A-                           V-    AHR episodes:   VHR episodes:       Plan:   Remote in:   Return to clinic:   
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   Uses remote monitoring.     Incision well healed.  No pocket discomfort.     Normal device function.   Battery voltage, sensing, and thresholds are normal.   Lead impedances are stable.     Battery longevity: 7.6 yrs    Presenting rhythm: AP   Underlying rhythm: CHB    Percent paced: A- 90%                           V- 99%    AHR episodes: none  VHR episodes: none      Plan:   Remote in: 3 months  Return to clinic: 6 months  
good, to achieve stated therapy goals

## 2022-09-28 NOTE — OCCUPATIONAL THERAPY INITIAL EVALUATION ADULT - KINESTHESIA, RLE, OT EVAL
PAST MEDICAL HISTORY:  CAD (coronary artery disease)     Diabetes Type2, not on any meds since weight loss after throat Ca    HLD (hyperlipidemia)     HTN (hypertension)     Inguinal hernia, left     MI (myocardial infarction) 1992    Renal insufficiency s/p chemo    Throat cancer 2011, treated with chemo, RT    Ventricular arrhythmia s/p AICD     within normal limits

## 2022-09-28 NOTE — DIETITIAN INITIAL EVALUATION ADULT - NSFNSPHYEXAMSKINFT_GEN_A_CORE
°F (36.6 °C)   Resp 18   Ht 5' 7\" (1.702 m)   Wt 198 lb 3.2 oz (89.9 kg)   SpO2 98%   BMI 31.04 kg/m²     Physical Exam  Constitutional:       Appearance: Normal appearance. He is well-developed. He is not ill-appearing. HENT:      Right Ear: Tympanic membrane normal.      Left Ear: Tympanic membrane is perforated. Nose: Nose normal.      Mouth/Throat:      Mouth: Mucous membranes are moist.   Neck:      Musculoskeletal: Normal range of motion. Cardiovascular:      Rate and Rhythm: Normal rate and regular rhythm. Pulmonary:      Effort: Pulmonary effort is normal.      Breath sounds: Normal breath sounds. Abdominal:      General: Abdomen is flat. Palpations: Abdomen is soft. Musculoskeletal:      Left shoulder: He exhibits crepitus. Skin:     General: Skin is warm and dry. Neurological:      General: No focal deficit present. Mental Status: He is alert and oriented to person, place, and time. Assessment:       Diagnosis Orders   1. Wellness examination  Lipid Panel    Comprehensive Metabolic Panel    CBC Auto Differential    Vitamin D 25 Hydroxy    Testosterone Free and Total Male   2. Low testosterone in male  Testosterone Free and Total Male   3. Low vitamin D level  Vitamin D 25 Hydroxy       Plan:       Geeta Davison received counseling on the following healthy behaviors: nutrition and exercise    Patient given educational materials on wellness    Will obtain routine Biometric labs  With prior History of Low T and Low Vitamin D will obtain labs to evaluate those leves as well as he does report some chronic fatigue and has not been taking Testosterone or Vitamin D supplementation with PMH of low levels    Pt is not fasting today suggested come in Monday fasting for labs as he reported a prior Triglyceride level of > 500    Discussed use, benefit, and side effects of prescribed medications. Barriers to medication compliance addressed. All patient questions answered.   Pt voiced understanding. Return in about 1 year (around 10/23/2021), or if symptoms worsen or fail to improve. will call pt if any lab abnormalities and suggest recommendations accordingly. Orders Placed This Encounter   Procedures    Lipid Panel     Order Specific Question:   Is Patient Fasting?/# of Hours     Answer:   12    Comprehensive Metabolic Panel    CBC Auto Differential    Vitamin D 25 Hydroxy     Standing Status:   Future     Standing Expiration Date:   4/23/2021    Testosterone Free and Total Male     No orders of the defined types were placed in this encounter. Electronically signed by CHARU Morris CNP on 10/23/2020 at 11:56 AM  Tobacco Cessation Counseling: Patient advised about behavior change, including information about personal health harms, usage of appropriate cessation measures and benefits of cessation.   Time spent (minutes): 5, pt declines No pressure ulcers

## 2022-09-28 NOTE — DIETITIAN INITIAL EVALUATION ADULT - OTHER INFO
Pt lives at home with her daughter and son-in-law, who does food shopping/cooking. Pt does not follow any dietary restrictions at home; reports general decrease in appetite/po intake PTA. Pt reports difficulty chewing/swallowing; requested pureed foods, no difficulty swallowing thin liquids. Pt declined Ensure Enlive/Ensure Plus High Protein nutrition supplement due to problems with diarrhea from Ensure/Boost in the past; amenable to Ensure Clear nutrition supplements for additional kcal and protein.  Pt reports ht 5'6" and UBW used to be 211 lbs., however unsure of recent UBW. Pt with visible severe malnutrition.

## 2022-09-28 NOTE — OCCUPATIONAL THERAPY INITIAL EVALUATION ADULT - BALANCE TRAINING, PT EVAL
Pt will increased standing balance from  poor+ to fair in 12 week to prevent falls, optimize pt's ability for ADL management & safely navigate in all terrains

## 2022-09-28 NOTE — OCCUPATIONAL THERAPY INITIAL EVALUATION ADULT - PERTINENT HX OF CURRENT PROBLEM, REHAB EVAL
Pt is an 83 y/o female who presented to ER on due right leg swelling and hypotension. As per chart pt stooped taking her medication  Pt is diagnosed with Acute DVT of RLE and fracture of right hip. Pt fell 4 weeks ago while in [place] and since then has difficulty with ambulation. X-ray of right hip on 9/26/22 results confirm subcapital right hip fracture with upward and lateral migration of the   distal segment.

## 2022-09-28 NOTE — OCCUPATIONAL THERAPY INITIAL EVALUATION ADULT - GENERAL OBSERVATIONS, REHAB EVAL
Pt was seen for initial OT consult, encountered in bed in NAD. with IV in BUE infusing; pt was AA&Ox4, cooperative & followed commands with verbal cues for sequencing. Pt c/o right hip pain due to s/p chronic fracture; this limits pt's activity tolerance ,balance, ADL management & functional mobility.

## 2022-09-28 NOTE — PHYSICAL THERAPY INITIAL EVALUATION ADULT - ADDITIONAL COMMENTS
Pt lives with daughter in a house with 6 KIMANI B far apart HR in the front and 3 steps in the back enterance no railing. Pt stays on the 1st floor. Pt was  independent with ambulation without AD prior to fall in [place] 4 weeks ago. Currently, pt ambulates with RW and dtr assists with ADLS. Pt reports she is never alone in the house and family available to assist. DME: Pt owns RW, shower chair, commode, cane, w/c

## 2022-09-28 NOTE — DIETITIAN INITIAL EVALUATION ADULT - PERTINENT LABORATORY DATA
09-27    144  |  111<H>  |  11  ----------------------------<  139<H>  3.9   |  26  |  0.56    Ca    8.0<L>      27 Sep 2022 09:53  Phos  2.7     09-27  Mg     2.0     09-27    TPro  5.5<L>  /  Alb  1.3<L>  /  TBili  0.5  /  DBili  x   /  AST  19  /  ALT  15  /  AlkPhos  92  09-27

## 2022-09-28 NOTE — OCCUPATIONAL THERAPY INITIAL EVALUATION ADULT - RANGE OF MOTION EXAMINATION, LOWER EXTREMITY
Left LE Active ROM was WFL (within functional limits)/Left LE Passive ROM was WFL (w/i functional limits)/Right LE Active ROM was WFL   (within functional limits)/Right LE Passive ROM was WFL  (within functional limits)

## 2022-09-28 NOTE — PHYSICAL THERAPY INITIAL EVALUATION ADULT - PERTINENT HX OF CURRENT PROBLEM, REHAB EVAL
82 year old female PMH of HTN but recently stopped meds due to hypotension presenting to ED due to R leg swelling without any CP or SOB. No fever/chills or syncope. Had R leg injury about 4 weeks ago while in [facility] and had decreased mobility due to injury on R knee/hip area. Had only right knee x-ray in [place] that was negative as per family. Pt has not been able to walk well. Outpatient urgent care center found right  femoral/popliteal area DVT on US. Pt is a 82y Female with a chronic R femoral neck fracture (In [place] Week of July 20th, approx >2 mo old), R Knee Tricompartmental OA, ?Chr R Patella Fx per Ortho Note.

## 2022-09-29 NOTE — PROGRESS NOTE ADULT - ASSESSMENT
82 year old female PMH of HTN but recently stopped meds due to hypotension presenting to ED due to R leg swelling without any CP or SOB. No fever/chills or syncope. Had R leg injury about 4 weeks ago in [facility] and had decreased mobility due to injury on R knee/hip area. Had only right knee x-ray in [place] that was negative as per family. Pt has not been able to walk well. Outpatient urgent care center found right  Femoral/popliteal area DVT on US. Admitted for AC.        Acute DVT right leg. Likely provoked by right hip fracture, pain leading to low immobility. cont iv heparin and follow for any active gross bleeding. Follow PTT. NOAC in next 24 hours.   Diffuse bony mineralization on X ray femur. check vitamin D level. start oscal D.   Recent right hip fracture. no visible deformity. Ortho consulted and follow recs. PT eval after ortho clearance.   Severe PCM. nutrition service is consulted.   Anemia of chronic disease. Falsely low Hb reported this morning repeat one at baseline. monitor for any active gross bleeding especially while on AC.   Low normal blood pressure. no dizziness. Gentle hydration. Monitor.     Full code  Iv heparin  PT eval 
82 year old female w/ history of  HTN p/w R leg swelling after R leg injury about 4 weeks ago in [facility] and was found to have a subacute right hip fracture w/ a right Femoral/popliteal DVT on US.      Right Femoral/popliteal DVT on US  - switch heparin gtt to Xarelto  - likely provoked by right hip fracture & immobility due to pain   - c/w Tylenol for pain  - right knee pain likely due to OA    Diffuse bony mineralization on X ray femur  - Vit D level WNL  - c/w Oscal      Right hip fracture  - Ortho note read and appreciated - no surgical intervention at this time but patient may benefit from an outpatient elective hip arthroplasty, with arthroplasty trained orthopaedic surgeon     Severe PCM  - nutrition service is consulted    Anemia of chronic disease  - monitor H&H    Prophylaxis:  DVT: Xarelto  GI: PO diet    Dispo: Sub-acute Rehab
82 year old female w/ history of  HTN p/w R leg swelling after R leg injury about 4 weeks ago in [facility] and was found to have a subacute right hip fracture w/ a right Femoral/popliteal DVT on US.      Right Femoral/popliteal DVT on US  - switch heparin gtt to Xarelto  - likely provoked by right hip fracture & immobility due to pain   - c/w Tylenol for pain  - right knee pain likely due to OA    Diffuse bony mineralization on X ray femur  - Vit D level WNL  - c/w Oscal      Right hip fracture  - Ortho note read and appreciated - no surgical intervention at this time but patient may benefit from an outpatient elective hip arthroplasty, with arthroplasty trained orthopaedic surgeon     Severe PCM  - nutrition service is consulted    Anemia of chronic disease  - monitor H&H    Prophylaxis:  DVT: Xarelto  GI: PO diet    Dispo: Sub-acute Rehab

## 2022-09-29 NOTE — PROGRESS NOTE ADULT - REASON FOR ADMISSION
Acute right leg DVT after hip fracture.

## 2022-09-29 NOTE — PROGRESS NOTE ADULT - NUTRITIONAL ASSESSMENT
This patient has been assessed with a concern for Malnutrition and has been determined to have a diagnosis/diagnoses of Severe protein-calorie malnutrition and Underweight (BMI < 19).    This patient is being managed with:   Diet Pureed-  Supplement Feeding Modality:  Oral  Ensure Clear Cans or Servings Per Day:  1       Frequency:  Three Times a day  Entered: Sep 28 2022 10:36AM    
This patient has been assessed with a concern for Malnutrition and has been determined to have a diagnosis/diagnoses of Severe protein-calorie malnutrition and Underweight (BMI < 19).    This patient is being managed with:   Diet Pureed-  Supplement Feeding Modality:  Oral  Ensure Enlive Cans or Servings Per Day:  1       Frequency:  Three Times a day  Entered: Sep 29 2022 12:16PM    Diet Pureed-  Supplement Feeding Modality:  Oral  Ensure Clear Cans or Servings Per Day:  1       Frequency:  Three Times a day  Entered: Sep 28 2022 10:36AM    The following pending diet order is being considered for treatment of Severe protein-calorie malnutrition and Underweight (BMI < 19):null

## 2022-09-29 NOTE — PROGRESS NOTE ADULT - SUBJECTIVE AND OBJECTIVE BOX
CHIEF COMPLAINT: Follow up of right leg DVT and right hip fracture  no active gross bleeding on iv heparin  no sob  no chest pain  + hip pain right side  no v/d/abd pain       PHYSICAL EXAM:    GENERAL: Malnourished, no acute distress   CHEST/LUNG: Clear to ausculation bilaterally, no wheezing, no crackles   HEART: Regular rate and rhythm; + murmur  ABDOMEN: Soft, Nontender, Nondistended; Bowel sounds present  EXTREMITIES:  Pain right hip on movements., No clubbing, cyanosis, or edema  NERVOUS SYSTEM:  Grossly non focal with limited ROM right hip.  Psychiatry: AAO, mood is appropriate       OBJECTIVE DATA:   Vital Signs Last 24 Hrs  T(C): 36.7 (27 Sep 2022 11:00), Max: 36.9 (27 Sep 2022 05:29)  T(F): 98 (27 Sep 2022 11:00), Max: 98.4 (27 Sep 2022 05:29)  HR: 79 (27 Sep 2022 11:00) (67 - 84)  BP: 98/60 (27 Sep 2022 11:00) (97/63 - 109/68)  BP(mean): --  RR: 18 (27 Sep 2022 11:00) (16 - 18)  SpO2: 97% (27 Sep 2022 11:00) (95% - 100%)    Parameters below as of 27 Sep 2022 11:00  Patient On (Oxygen Delivery Method): room air               Daily     Daily   LABS:                        9.7    11.15 )-----------( 178      ( 27 Sep 2022 09:20 )             30.5             09-27    144  |  111<H>  |  11  ----------------------------<  139<H>  3.9   |  26  |  0.56    Ca    8.0<L>      27 Sep 2022 09:53  Phos  2.7     09-27  Mg     2.0     09-27    TPro  5.5<L>  /  Alb  1.3<L>  /  TBili  0.5  /  DBili  x   /  AST  19  /  ALT  15  /  AlkPhos  92  09-27              PT/INR - ( 26 Sep 2022 14:10 )   PT: 13.6 sec;   INR: 1.14 ratio         PTT - ( 27 Sep 2022 09:53 )  PTT:51.9 sec          CAPILLARY BLOOD GLUCOSE      POCT Blood Glucose.: 97 mg/dL (26 Sep 2022 19:43)         Interval Radiology studies: reviewed by me    < from: Xray Femur 2 Views, Right (09.26.22 @ 16:38) >  IMPRESSION:  1. Subcapital right hip fracture with upward and lateral migration of the   distal segment. There is no dislocation.  2. Osteoarthritis of the right knee.  3. Diffuse bony demineralization.    < end of copied text >      MEDICATIONS  (STANDING):  dextrose 5% + sodium chloride 0.9%. 1000 milliLiter(s) (100 mL/Hr) IV Continuous <Continuous>  heparin  Infusion.  Unit(s)/Hr (8 mL/Hr) IV Continuous <Continuous>  multivitamin 1 Tablet(s) Oral daily    MEDICATIONS  (PRN):  acetaminophen     Tablet .. 650 milliGRAM(s) Oral every 6 hours PRN Mild Pain (1 - 3), Moderate Pain (4 - 6)  heparin   Injectable 3500 Unit(s) IV Push every 6 hours PRN For aPTT less than 40  heparin   Injectable 1500 Unit(s) IV Push every 6 hours PRN For aPTT between 40 - 57      
82 year old female w/ history of  HTN p/w R leg swelling after R leg injury about 4 weeks ago in [facility] and was found to have a subacute right hip fracture w/ a right Femoral/popliteal DVT on US. She is lying in bed in NAD.    MEDICATIONS  (STANDING):  calcium carbonate   1250 mG (OsCal) 1 Tablet(s) Oral two times a day  multivitamin 1 Tablet(s) Oral daily  rivaroxaban 15 milliGRAM(s) Oral two times a day with meals    MEDICATIONS  (PRN):  acetaminophen     Tablet .. 650 milliGRAM(s) Oral every 6 hours PRN Mild Pain (1 - 3), Moderate Pain (4 - 6)      Allergies    No Known Allergies    Intolerances        Vital Signs Last 24 Hrs  T(C): 36.6 (28 Sep 2022 16:59), Max: 36.8 (27 Sep 2022 23:30)  T(F): 97.9 (28 Sep 2022 16:59), Max: 98.3 (27 Sep 2022 23:30)  HR: 82 (28 Sep 2022 16:59) (73 - 98)  BP: 99/64 (28 Sep 2022 16:59) (99/64 - 112/75)   RR: 18 (28 Sep 2022 16:59) (16 - 18)  SpO2: 95% (28 Sep 2022 16:59) (93% - 100%)    Parameters below as of 28 Sep 2022 16:59  Patient On (Oxygen Delivery Method): room air        PHYSICAL EXAM:  GENERAL: NAD, well-groomed, well-developed  HEAD:  Atraumatic, Normocephalic  EYES: EOMI, PERRLA   NECK: Supple   NERVOUS SYSTEM:  Alert & Oriented X3, Good concentration   CHEST/LUNG: Clear to auscultation bilaterally; No rales, rhonchi, wheezing, or rubs  HEART: Regular rate and rhythm; No murmurs, rubs, or gallops  ABDOMEN: Soft, Nontender, Nondistended; Bowel sounds present  EXTREMITIES: No clubbing, cyanosis, or edema     LABS:                        9.0    8.12  )-----------( 176      ( 28 Sep 2022 06:27 )             29.0     09-27    144  |  111<H>  |  11  ----------------------------<  139<H>  3.9   |  26  |  0.56    Ca    8.0<L>      27 Sep 2022 09:53  Phos  2.7     09-27  Mg     2.0     09-27    TPro  5.5<L>  /  Alb  1.3<L>  /  TBili  0.5  /  DBili  x   /  AST  19  /  ALT  15  /  AlkPhos  92  09-27    PTT - ( 28 Sep 2022 15:00 )  PTT:30.2 sec    RADIOLOGY & ADDITIONAL TESTS:    09-28-22 @ 07:01  -  09-28-22 @ 18:57  --------------------------------------------------------  IN:    Oral Fluid: 240 mL  Total IN: 240 mL    OUT:  Total OUT: 0 mL    Total NET: 240 mL      
82 year old female w/ history of  HTN p/w R leg swelling after R leg injury about 4 weeks ago in [facility] and was found to have a subacute right hip fracture w/ a right Femoral/popliteal DVT on US. She is lying in bed in NAD.     MEDICATIONS  (STANDING):  calcium carbonate   1250 mG (OsCal) 1 Tablet(s) Oral two times a day  multivitamin 1 Tablet(s) Oral daily  rivaroxaban 15 milliGRAM(s) Oral two times a day with meals    MEDICATIONS  (PRN):  acetaminophen     Tablet .. 650 milliGRAM(s) Oral every 6 hours PRN Mild Pain (1 - 3), Moderate Pain (4 - 6)      Allergies    No Known Allergies    Intolerances        Vital Signs Last 24 Hrs  T(C): 36.4 (29 Sep 2022 17:08), Max: 37 (28 Sep 2022 23:44)  T(F): 97.5 (29 Sep 2022 17:08), Max: 98.6 (28 Sep 2022 23:44)  HR: 92 (29 Sep 2022 17:08) (84 - 92)  BP: 97/64 (29 Sep 2022 17:08) (97/64 - 121/81)  BP(mean): --  RR: 17 (29 Sep 2022 17:08) (17 - 18)  SpO2: 98% (29 Sep 2022 17:08) (98% - 100%)    Parameters below as of 29 Sep 2022 17:08  Patient On (Oxygen Delivery Method): room air        PHYSICAL EXAM:  GENERAL: NAD, well-groomed, well-developed  HEAD:  Atraumatic, Normocephalic  EYES: EOMI, PERRLA   NECK: Supple   NERVOUS SYSTEM:  Alert & Oriented X3, Good concentration   CHEST/LUNG: Clear to auscultation bilaterally; No rales, rhonchi, wheezing, or rubs  HEART: Regular rate and rhythm; No murmurs, rubs, or gallops  ABDOMEN: Soft, Nontender, Nondistended; Bowel sounds present  EXTREMITIES: No clubbing, cyanosis, or edema    LABS:                        8.7    6.23  )-----------( 168      ( 29 Sep 2022 08:10 )             27.6           PTT - ( 28 Sep 2022 15:00 )  PTT:30.2 sec    CAPILLARY BLOOD GLUCOSE          RADIOLOGY & ADDITIONAL TESTS:    09-28-22 @ 07:01  -  09-29-22 @ 07:00  --------------------------------------------------------  IN:    Oral Fluid: 240 mL  Total IN: 240 mL    OUT:  Total OUT: 0 mL    Total NET: 240 mL

## 2022-09-30 NOTE — DISCHARGE NOTE PROVIDER - CARE PROVIDER_API CALL
Your PCP,   Phone: (   )    -  Fax: (   )    -  Follow Up Time:     Phan Mclean (DO)  Orthopaedic Surgery  [address] [place] [address]  Phone: [phone]  Fax: [phone]  Follow Up Time: 1 week

## 2022-09-30 NOTE — DISCHARGE NOTE NURSING/CASE MANAGEMENT/SOCIAL WORK - NSDCPEFALRISK_GEN_ALL_CORE
For information on Fall & Injury Prevention, visit: [email or web address] OR  [email or web address] OR  https://www.cdc.gov/steadi/patient.html

## 2022-09-30 NOTE — DISCHARGE NOTE PROVIDER - NSDCMRMEDTOKEN_GEN_ALL_CORE_FT
acetaminophen 325 mg oral tablet: 2 tab(s) orally every 6 hours, As needed, Mild Pain (1 - 3), Moderate Pain (4 - 6)  calcium carbonate 1250 mg (500 mg elemental calcium) oral tablet: 1 tab(s) orally 2 times a day  Multiple Vitamins oral tablet: 1 tab(s) orally once a day  rivaroxaban 15 mg oral tablet: 1 tab(s) orally 2 times a day (with meals) x 18 more days then switch to 20mg tab daily

## 2022-09-30 NOTE — DISCHARGE NOTE NURSING/CASE MANAGEMENT/SOCIAL WORK - NSDCVIVACCINE_GEN_ALL_CORE_FT
Pfizer-BioNTech Covid-19 Vaccine, Bivalent; 30-Sep-2022 14:36; Myra Hernandez (RN); [employer] Inc; [id number] (Exp. Date: 30-Jun-2023); IntraMuscular; Deltoid Left.; 0.3 milliLiter(s);

## 2022-09-30 NOTE — DISCHARGE NOTE NURSING/CASE MANAGEMENT/SOCIAL WORK - PATIENT PORTAL LINK FT
You can access the FollowMyHealth Patient Portal offered by [facility] by registering at the following website: [email or web address] By joining [email or web address]’s FollowMyHealth portal, you will also be able to view your health information using other applications (apps) compatible with our system.

## 2022-09-30 NOTE — DISCHARGE NOTE PROVIDER - NSDCCPCAREPLAN_GEN_ALL_CORE_FT
PRINCIPAL DISCHARGE DIAGNOSIS  Diagnosis: Acute deep vein thrombosis (DVT) of right lower extremity  Assessment and Plan of Treatment:       SECONDARY DISCHARGE DIAGNOSES  Diagnosis: Fracture of right hip  Assessment and Plan of Treatment:

## 2022-09-30 NOTE — DISCHARGE NOTE PROVIDER - PROVIDER TOKENS
FREE:[LAST:[Your PCP],PHONE:[(   )    -],FAX:[(   )    -]],PROVIDER:[TOKEN:[[id number]],FOLLOWUP:[1 week]]

## 2022-09-30 NOTE — DISCHARGE NOTE PROVIDER - HOSPITAL COURSE
82 year old female w/ history of  HTN p/w R leg swelling after R leg injury about 4 weeks ago in [facility] and was found to have a subacute right hip fracture w/ a right Femoral/popliteal DVT on US. She was put on heparin gtt and then switched to Xarelto. Her DVT was likely provoked by right hip fracture & immobility due to pain. She was also put on Oscal due to diffuse bony mineralization on X ray of the femur. Ortho was consulted for the hip fracture but recommended no surgical intervention at this time. They noted that the patient may benefit from an outpatient elective hip arthroplasty, with arthroplasty trained orthopaedic surgeon.    Discharge time: 43 minutes        Vital Signs Last 24 Hrs  T(C): 36.6 (30 Sep 2022 11:51), Max: 36.8 (30 Sep 2022 05:10)  T(F): 97.9 (30 Sep 2022 11:51), Max: 98.2 (30 Sep 2022 05:10)  HR: 95 (30 Sep 2022 11:51) (84 - 95)  BP: 104/68 (30 Sep 2022 11:51) (97/64 - 121/73)   RR: 18 (30 Sep 2022 11:51) (16 - 18)  SpO2: 98% (30 Sep 2022 11:51) (97% - 100%)     PHYSICAL EXAM:  GENERAL: NAD, well-groomed, well-developed  HEAD:  Atraumatic, Normocephalic  EYES: EOMI, PERRLA   NECK: Supple   NERVOUS SYSTEM:  Alert & Oriented X3, Good concentration   CHEST/LUNG: Clear to auscultation bilaterally; No rales, rhonchi, wheezing, or rubs  HEART: Regular rate and rhythm; No murmurs, rubs, or gallops  ABDOMEN: Soft, Nontender, Nondistended; Bowel sounds present  EXTREMITIES: No clubbing, cyanosis, or edema

## 2022-09-30 NOTE — DISCHARGE NOTE PROVIDER - DETAILS OF MALNUTRITION DIAGNOSIS/DIAGNOSES
This patient has been assessed with a concern for Malnutrition and was treated during this hospitalization for the following Nutrition diagnosis/diagnoses:     -  09/28/2022: Severe protein-calorie malnutrition   -  09/28/2022: Underweight (BMI < 19)

## 2022-10-20 PROBLEM — I10 ESSENTIAL (PRIMARY) HYPERTENSION: Chronic | Status: ACTIVE | Noted: 2022-01-01

## 2022-10-23 NOTE — ED ADULT NURSE NOTE - OBJECTIVE STATEMENT
pt brought in by Khadar HODGE due to vaginal bleeding and hypotension. Pt moans to pain, no verbal responses. pt brought in by Khadar HODGE due to vaginal bleeding. Per report, pt has been declining over the past few days and not eating. Pt moans to pain, no verbal responses. Skin is cool, unable to obtain blood pressure. MD, RN x 3 at bedside, IV access obtained, fluid bolus x 2 started. Abdomen rigid with bright red blood noted in pt's brief. Per report, recent dvt and pt is on xarelto. Breathing noted to be shallow, placed on non-rebreather at 15L. Defib pads placed, HR irregular in 110s-150's. Per MD, phone call with family in which they are requesting pt be kept comfortable, family en route. Pt given 2 mg morphine IV.

## 2022-10-23 NOTE — H&P ADULT - ASSESSMENT
IMPROVE VTE Individual Risk Assessment    RISK                                                                Points    [x  ] Previous VTE                                                  3    [  ] Thrombophilia                                               2    [  ] Lower limb paralysis                                      2        (unable to hold up >15 seconds)      [  ] Current Cancer                                              2         (within 6 months)    [  x] Immobilization > 24 hrs                                1    [  ] ICU/CCU stay > 24 hours                              1    [ x ] Age > 60                                                      1    IMPROVE VTE Score _5_______    IMPROVE Score 0-1: Low Risk, No VTE prophylaxis required for most patients, encourage ambulation.   IMPROVE Score 2-3: At risk, pharmacologic VTE prophylaxis is indicated for most patients (in the absence of a contraindication)  IMPROVE Score > or = 4: High Risk, pharmacologic VTE prophylaxis is indicated for most patients (in the absence of a contraindication)

## 2022-10-23 NOTE — ED PROVIDER NOTE - CLINICAL SUMMARY MEDICAL DECISION MAKING FREE TEXT BOX
81 y/o F presenting from [facility]. She is hypotensive upon arrival. She appears cachectic. Patient initially full code but appears to be at end stage of life and critically ill. Poor prognosis likely. Discussed with daughter and son on phone, request to keep patient comfortable at this time, they will come see patient.

## 2022-10-23 NOTE — ED ADULT TRIAGE NOTE - CHIEF COMPLAINT QUOTE
vaginal bleeding hypotensive , full code from [facility] vaginal bleeding hypotensive , full code from [facility], on Xarelto.

## 2022-10-23 NOTE — ED ADULT NURSE NOTE - NSIMPLEMENTINTERV_GEN_ALL_ED
Implemented All Fall with Harm Risk Interventions:  [place] to call system. Call bell, personal items and telephone within reach. Instruct patient to call for assistance. Room bathroom lighting operational. Non-slip footwear when patient is off stretcher. Physically safe environment: no spills, clutter or unnecessary equipment. Stretcher in lowest position, wheels locked, appropriate side rails in place. Provide visual cue, wrist band, yellow gown, etc. Monitor gait and stability. Monitor for mental status changes and reorient to person, place, and time. Review medications for side effects contributing to fall risk. Reinforce activity limits and safety measures with patient and family. Provide visual clues: red socks.

## 2022-10-23 NOTE — ED PROVIDER NOTE - OBJECTIVE STATEMENT
82 year old female w/ history of HTN, right hip fracture with DVT on xarelto, presenting to the ED from [facility] for vaginal bleeding. Per nursing, patient has been declining over the past few days, becoming less responsive and refusing to eat. Tonight was found to be bleeding vaginally. Patient on arrival is unresponsive and cannot participate in exam.

## 2022-10-23 NOTE — H&P ADULT - NSHPLABSRESULTS_GEN_ALL_CORE
LABS:                        8.6    13.79 )-----------( 195      ( 23 Oct 2022 05:30 )             27.8     10-23    152<H>  |  120<H>  |  44<H>  ----------------------------<  100<H>  4.3   |  24  |  0.78    Ca    8.7      23 Oct 2022 05:30    TPro  5.2<L>  /  Alb  1.1<L>  /  TBili  0.7  /  DBili  x   /  AST  19  /  ALT  20  /  AlkPhos  93  10-23    PT/INR - ( 23 Oct 2022 05:30 )   PT: 20.0 sec;   INR: 1.67 ratio         PTT - ( 23 Oct 2022 05:30 )  PTT:34.2 sec    < from: CT Abdomen and Pelvis w/ IV Cont (10.23.22 @ 08:05) >    Large lower lobe pulmonary emboli, as described above. Follow-up   dedicated CTA of the chest obtained for further evaluation if clinically   warranted.    Large amount stool throughout the colon and within the rectum with marked   distention of the cecum which measures up to 9.9 cm. Distended,   air-filled mid to distal small bowel loop with regions of marked wall   thickening in the right lower quadrant. Enteritis due to inflammatory,   ischemic or infectious etiologies should be considered. Please note that   evaluation of the bowel is somewhat limited due to lack of oral contrast.    Critical findings were discussed with Dr. Sliva on 10/23/2022 8:35 AM.    Intra and extra hepatic biliary ductal dilatation as well as prominence   of the pancreatic duct in the region of the head. Correlate with   nonemergent MRI/MRCP if clinically warranted.    Additional findings as above.    < end of copied text >

## 2022-10-23 NOTE — ED PROVIDER NOTE - PHYSICAL EXAMINATION
GENERAL: Awake, unresponsive, severely cachectic   HEENT: NC/AT, dry mucous membranes  LUNGS: CTAB, no wheezes or crackles   CARDIAC: RRR, no m/r/g  ABDOMEN: Soft,  non tender, non distended, no rebound, no guarding  EXT: No edema, no deformities.  NEURO: A&Ox0. Moving all extremities, minimally.  SKIN: Warm and dry. No rash.

## 2022-10-23 NOTE — H&P ADULT - NSHPPHYSICALEXAM_GEN_ALL_CORE
PHYSICAL EXAM:    GENERAL  cachectic  in  NAD  HEAD:  Atraumatic, Normocephalic  EYES: EOMI, PERRLA, conjunctiva and sclera clear    NECK: Supple, No JVD, Normal thyroid  NERVOUS SYSTEM:  Alert  lethargic  moves  all extr  CHEST/LUNG: Clear  bilaterally; No rales, rhonchi, wheezing, or rubs  HEART: Regular rate and rhythm; No murmurs, rubs, or gallops  ABDOMEN: Soft, Nontender, Nondistended; no  masses Bowel sounds present  EXTREMITIES:  + Peripheral Pulses, No clubbing, cyanosis, or edema  LYMPH: No lymphadenopathy noted   RECTAL: deferred    SKIN: No rashes or lesions

## 2022-10-23 NOTE — ED PROVIDER NOTE - PROGRESS NOTE DETAILS
Discussed with daughter and son on phone, request to keep patient comfortable. Will make DNR/DNI at this time. They are on way to see patient. Pt was seen and treated by Dr. Cory Elkins pt's daughter is here at bed side and being explained pt's condition and signed DNR/DNI. daughter Erika Nieto [phone] is at bed side she is notified about the cta chest/abdomen/pelvis + pulmonary embolism and if started heparin pt will bleed in vagina she agrees no treatment. Dr. Ulloa who cared for pt at [facility]  is notified and admit pt. daughter Erika Nieto [phone] is at bed side she is notified about the cta chest/abdomen/pelvis + pulmonary embolism and if started heparin pt will bleed in vagina she agrees no treatment. she just wants pt to be comfortable. No has a large clot in the external vagina.

## 2022-10-23 NOTE — ED PROVIDER NOTE - CARE PLAN
1 Principal Discharge DX:	Vaginal bleeding  Secondary Diagnosis:	Enteritis   Principal Discharge DX:	Vaginal bleeding  Secondary Diagnosis:	Enteritis  Secondary Diagnosis:	Pulmonary embolism

## 2022-10-23 NOTE — ED ADULT NURSE REASSESSMENT NOTE - NS ED NURSE REASSESS COMMENT FT1
Pt resting comfortably in bed, remains on non-rebreather, Respirations equal and unlabored. No acute distress noted at this time. Continuous fluids running at this time per MAR orders. Pt resting comfortably in bed, remains on non-rebreather, Respirations equal and labored. No acute distress noted at this time. Continuous fluids running at this time per MAR orders.

## 2022-10-24 NOTE — PATIENT PROFILE ADULT - NSPROMEDSBROUGHTTOHOSP_GEN_A_NUR
Patient updated on plan of care. Orders to recheck BP in 45min.      Singh Rico RN  11/21/18 1726
no

## 2022-10-24 NOTE — RAPID RESPONSE TEAM SUMMARY - NSOTHERINTERVENTIONSRRT_GEN_ALL_CORE
1L bolus LR  Continue IVF 1L bolus LR in progress with repeat /78, HR 79 and SpO2 94% on NRB.  Continue IVF 1L bolus LR in progress with repeat /78, HR 79 and SpO2 unable to obtain pt on NRB.  Continue IVF

## 2022-10-24 NOTE — CONSULT NOTE ADULT - PROBLEM SELECTOR RECOMMENDATION 2
CT Large lower lobe pulmonary emboli  not a candidate for heparin in light of vaginal bleeding, patient on NRB  morphine IVP PRN for dyspnea/pain

## 2022-10-24 NOTE — CONSULT NOTE ADULT - PROBLEM SELECTOR RECOMMENDATION 4
Clinical evidence indicates that the patient has Severe protein calorie malnutrition/ 3rd degree    In context of Chronic Illness (>1 month)    Energy/Food intake <50% of estimated energy requirement >5 days  Body Fat loss: Severe   Cachexia, temporal wasting and muscle atrophy   Strength: weakened severe (bedbound)    patient appears to be actively dying.

## 2022-10-24 NOTE — CONSULT NOTE ADULT - PROBLEM SELECTOR RECOMMENDATION 6
See GOC above.  Patient with MOLST on file indicating DNR/I/No artifical nutrition and comfort measures. IVF discontinued and symptomatic support with PRN morphine for dyspnea/pain, glycopyrrolate for secretions and ativan for agitation/anxiety.  Family at bedside.      Discussed with Dr. Larry

## 2022-10-24 NOTE — PROGRESS NOTE ADULT - SUBJECTIVE AND OBJECTIVE BOX
INTERVAL HPI/OVERNIGHT EVENTS:        REVIEW OF SYSTEMS:  CONSTITUTIONAL: cachectic  lethargic  comfortable  MEDICATION:  dextrose 5% + sodium chloride 0.9%. 1000 milliLiter(s) IV Continuous <Continuous>  LORazepam   Injectable 0.5 milliGRAM(s) IV Push every 8 hours PRN  morphine  - Injectable 2 milliGRAM(s) IV Push every 4 hours PRN    Vital Signs Last 24 Hrs  T(C): 36.6 (23 Oct 2022 23:31), Max: 36.6 (23 Oct 2022 19:54)  T(F): 97.9 (23 Oct 2022 23:31), Max: 97.9 (23 Oct 2022 19:54)  HR: 151 (24 Oct 2022 09:32) (76 - 160)  BP: 61/56 (24 Oct 2022 09:32) (61/56 - 115/78)  BP(mean): --  RR: 26 (24 Oct 2022 09:32) (12 - 26)  SpO2: 91% (24 Oct 2022 09:32) (85% - 100%)    Parameters below as of 24 Oct 2022 09:32  Patient On (Oxygen Delivery Method): mask, nonrebreather  O2 Flow (L/min): 10      PHYSICAL EXAM:  GENERAL: NAD,  HEENT : Conjuntivae  pale  sclerae anicteric  NECK: Supple, No JVD, Normal thyroid  NERVOUS SYSTEM:  lethargic  moves  all extr  CHEST/LUNG: Clear    HEART: Regular rate and rhythm; No murmurs, rubs, or gallops  ABDOMEN: Soft, Nontender, Nondistended; Bowel sounds present  EXTREMITIES:  no  edema no  tenderness  SKIN: No rashes   LABS:                        6.3    16.11 )-----------( x        ( 24 Oct 2022 06:10 )             20.3     10-24    154<H>  |  125<H>  |  38<H>  ----------------------------<  129<H>  3.6   |  21<L>  |  0.72    Ca    7.7<L>      24 Oct 2022 06:10    TPro  4.1<L>  /  Alb  0.9<L>  /  TBili  0.5  /  DBili  x   /  AST  21  /  ALT  20  /  AlkPhos  80  10-24    PT/INR - ( 23 Oct 2022 05:30 )   PT: 20.0 sec;   INR: 1.67 ratio         PTT - ( 23 Oct 2022 05:30 )  PTT:34.2 sec    CAPILLARY BLOOD GLUCOSE          RADIOLOGY & ADDITIONAL TESTS:    Imaging reports  Personally Reviewed:  [ ] YES  [ ] NO    Consultant(s) Notes Reviewed:  [ ] YES  [ ] NO    Care Discussed with Consultants/Other Providers [x ] YES  [ ] NO  Problem/Plan - 1:  ·  Problem: Adult failure to thrive.   ·  Plan: comfort  care  . discussed  with  pt's  daughter Erika Jean  wants  comfort  care  no  transfusions    Problem/Plan - 2:  ·  Problem: Pulmonary embolism.   ·  Plan: O2  supplementatin.    Problem/Plan - 3:  ·  Problem: Vaginal bleeding.   ·  Plan: IVF monitor  off  AC.

## 2022-10-24 NOTE — CONSULT NOTE ADULT - ASSESSMENT
82 year old female PMH of HTN and recent DVT presented to the ED from [facility] for unresponsiveness.  Patient found to be hypotensive and noted to have vaginal bleeding and findings of PE on CT scan.  Family opting for comfort measures.  Palliative care consulted for GOC.

## 2022-10-24 NOTE — RAPID RESPONSE TEAM SUMMARY - NSSITUATIONBACKGROUNDRRT_GEN_ALL_CORE
83 y/o F presenting from [facility] unresponsive. Pt  hypotensive upon arrival with  vaginal bleed and  PE on CT. Patient cachectic has  been on remeron and marinol at [facility] with poor  results.  Pt initially full code, but appears to be at end stage of life and critically ill. Poor prognosis likely. ER MD discussed with daughter and son over the phone, requesting to keep patient comfortable at this time. DNR/DNI instituted and they will come to see patient.  Patient DNR/DNI can go to regular med/surg floor with comfort care. Discussed  with  pt's  niece  at  bedside.

## 2022-10-24 NOTE — ED ADULT NURSE REASSESSMENT NOTE - NS ED NURSE REASSESS COMMENT FT1
Spoke with Dr Holly regarding pt BP, he stated family wanted her on comfort/end of life care. No new orders at this time.

## 2022-10-24 NOTE — PATIENT PROFILE ADULT - FUNCTIONAL ASSESSMENT - BASIC MOBILITY 6.
1-calculated by average/Not able to assess (calculate score using [facility] averaging method)

## 2022-10-24 NOTE — RAPID RESPONSE TEAM SUMMARY - NSADDTLFINDINGSRRT_GEN_ALL_CORE
RRT called for hypotension and per RN unable to get vitals (BP). Last BP obtained 77/55. Per chart, patient DNR/DNI and family requesting comfort care. RRT called for hypotension and per RN unable to get vitals (BP and SpO2). Last BP obtained 77/55. Per chart, patient DNR/DNI and family requesting comfort care.

## 2022-10-24 NOTE — CONSULT NOTE ADULT - CONVERSATION DETAILS
Spoke with patient's daughter, Erika and family at bedside.  Erika expressed understanding that her mother has vaginal bleeding and PE and is not a candidate for heparin because of increased bleeding concern.  Discussed patient appears to be actively dying and plan to focus on her comfort.  Discussed cessation of IVF as well as it would not alter trajectory and may cause more harm than benefit.  Patient with MOLST on file indicating DNR/I/No artificial nutrition/ and comfort measures.

## 2022-10-24 NOTE — CONSULT NOTE ADULT - PROBLEM SELECTOR RECOMMENDATION 9
CT abdomen: Large amount stool throughout the colon and within the rectum with marked distention of the cecum which measures up to 9.9 cm  abdomen distended on exam CT abdomen: Large amount stool throughout the colon and within the rectum with marked distention of the cecum which measures up to 9.9 cm, unclear etiology  abdomen distended on exam  patient appears to be actively dying-will defer cathartics at this time

## 2022-10-24 NOTE — CONSULT NOTE ADULT - SUBJECTIVE AND OBJECTIVE BOX
HPI:  	83 y/o F presenting from [facility] unresponsive. Pt  hypotensive upon arrival.  with  vaginal bleed  and  PE  on  CT . Patient cachectic has  been  on  remeron and  marinol  ant  [facility] with poor  results  Pt   initially full code but appears to be at end stage of life and critically ill. Poor prognosis likely. ER MD Discussed with daughter and son on phone, request to keep patient comfortable at this time, DNR DNI  instituted they will come see patient.  patient   DNR DNI can  go  to  regular  med  surg floor  with  comfort  care   discussed  with  pt's  niece  at  bedside     (23 Oct 2022 10:47)    PERTINENT PM/SXH:   No pertinent past medical history    HTN (hypertension)    DVT, lower extremity    Adult failure to thrive      No significant past surgical history      FAMILY HISTORY:    ITEMS NOT CHECKED ARE NOT PRESENT    SOCIAL HISTORY:   Significant other/partner:  [ ]  Children: yes  [ ]  Catholic/Spirituality: Sikhism  Substance hx:  [ ]   Tobacco hx:  [ ]   Alcohol hx: [ ]   Home Opioid hx:  [ ] I-Stop Reference No: Reference #: [id number]    Others' Prescriptions  Patient Name: Susy Gomez Date: 1940  Address: [address], [place] [address]Dpe: Female  Rx Written	Rx Dispensed	Drug	Quantity	Days Supply	Prescriber Name	Prescriber Debra #	Payment Method	Dispenser  10/17/2022	10/17/2022	dronabinol 2.5 mg capsule	28	14	Milan Ulloa MD	[id number]	[place]	[employer] [facility] [employer]    Patient Name: Susy Gomez Date: 1940  Address: [id number] [place] [id number]: Female  Rx Written	Rx Dispensed	Drug	Quantity	Days Supply	Prescriber Name	Prescriber Debra #	Payment Method	Dispenser  04/16/2022	04/18/2022	codeine-guaifen  mg/5 ml	240ml	6	Jhonatan Moseley DO	[id number]	Insurance	[id number]  Living Situation: [ ]Home  [ ]Long term care  [x ]Rehab [ ]Other    ADVANCE DIRECTIVES:    DNR  MOLST  [x ]  Living Will  [ ]   DECISION MAKER(s):  [ ] Health Care Proxy(s)  [x] Surrogate(s)  [ ] Guardian           Name(s): Phone Number(s): Erika Jean [phone]    BASELINE (I)ADL(s) (prior to admission):  [place]: [ ]Total  [ ] Moderate [ ]Dependent    Allergies    No Known Allergies    Intolerances    MEDICATIONS  (STANDING):    MEDICATIONS  (PRN):  glycopyrrolate Injectable 0.4 milliGRAM(s) IV Push every 6 hours PRN secretions  LORazepam   Injectable 0.5 milliGRAM(s) IV Push every 8 hours PRN Anxiety  morphine  - Injectable 2 milliGRAM(s) IV Push every 4 hours PRN Moderate Pain (4 - 6)  morphine  - Injectable 2 milliGRAM(s) IV Push every 4 hours PRN dyspnea    PRESENT SYMPTOMS: [ ]Unable to obtain due to poor mentation   Source if other than patient:  [ ]Family   [ ]Team     Pain: [ ] yes [ ] no  QOL impact -   Location -                    Aggravating factors -  Quality -  Radiation -  Timing-  Severity (0-10 scale):  Minimal acceptable level (0-10 scale):     PAIN AD Score: 1    http://geriatrictoolkit.[facility]/cog/painad.pdf (press ctrl +  left click to view)    Dyspnea:                           [ ]Mild [ ]Moderate [ ]Severe  Anxiety:                             [ ]Mild [ ]Moderate [ ]Severe  Fatigue:                             [ ]Mild [ ]Moderate [ ]Severe  Nausea:                             [ ]Mild [ ]Moderate [ ]Severe  Loss of appetite:              [ ]Mild [ ]Moderate [ ]Severe  Constipation:                    [ ]Mild [ ]Moderate [ ]Severe    Other Symptoms:  [ ]All other review of systems negative     Karnofsky Performance Score/Palliative Performance Status Version 2:      20   %    http://npcrc.org/files/news/palliative_performance_scale_ppsv2.pdf  PHYSICAL EXAM:  Vital Signs Last 24 Hrs  T(C): 36.6 (23 Oct 2022 23:31), Max: 36.6 (23 Oct 2022 19:54)  T(F): 97.9 (23 Oct 2022 23:31), Max: 97.9 (23 Oct 2022 19:54)  HR: 151 (24 Oct 2022 09:32) (76 - 160)  BP: 61/56 (24 Oct 2022 09:32) (61/56 - 115/78)  BP(mean): --  RR: 26 (24 Oct 2022 09:32) (12 - 26)  SpO2: 91% (24 Oct 2022 09:32) (85% - 100%)    Parameters below as of 24 Oct 2022 09:32  Patient On (Oxygen Delivery Method): mask, nonrebreather  O2 Flow (L/min): 10   I&O's Summary  GENERAL:  [ ]Alert  [ ]Oriented x   [ ]Lethargic  [ ]Cachexia  [ x]Unarousable  [ ]Verbal  [x ]Non-Verbal  Behavioral:   [ ] Anxiety  [ ] Delirium [ ] Agitation [ ] Other  HEENT:  [ ]Normal   [x ]Dry mouth   [ ]ET Tube/Trach  [ ]Oral lesions  PULMONARY:   [x ]Clear [ ]Tachypnea  [ ]Audible excessive secretions   [ ]Rhonchi        [ ]Right [ ]Left [ ]Bilateral  [ ]Crackles        [ ]Right [ ]Left [ ]Bilateral  [ ]Wheezing     [ ]Right [ ]Left [ ]Bilateral  CARDIOVASCULAR:    [ ]Regular [ ]Irregular [ x]Tachy  [ ]Eliezer [ ]Murmur [ ]Other  GASTROINTESTINAL:  [ ]Soft  [x ]Distended   [ ]+BS  [ ]Non tender [ ]Tender  [ ]PEG [ ]OGT/ NGT  Last BM: prior to admission GENITOURINARY:  [ ]Normal [ x] Incontinent   [ ]Oliguria/Anuria   [ ]Esquivel  MUSCULOSKELETAL:   [ ]Normal   [ ]Weakness  [ x]Bed/Wheelchair bound [ x]Edema bilateral lower extremity edema  NEUROLOGIC:   [ ]No focal deficits  [ x] Cognitive impairment  [ ] Dysphagia [ ]Dysarthria [ ] Paresis [ ]Other   SKIN:   [ ]Normal   [ ]Pressure ulcer(s)  [ ]Rash    CRITICAL CARE:  [ ] Shock Present  [ ]Septic [ ]Cardiogenic [ ]Neurologic [ ]Hypovolemic  [ ]  Vasopressors [ ]  Inotropes   [ ] Respiratory failure present [ ] mechanical ventilation [ ] non-invasive ventilatory support [ ] High flow  [ ] Acute  [ ] Chronic [ ] Hypoxic  [ ] Hypercarbic [ ] Other  [ ] Other organ failure     LABS:                        6.3    16.11 )-----------( 99       ( 24 Oct 2022 06:10 )             20.3   10-24    154<H>  |  125<H>  |  38<H>  ----------------------------<  129<H>  3.6   |  21<L>  |  0.72    Ca    7.7<L>      24 Oct 2022 06:10    TPro  4.1<L>  /  Alb  0.9<L>  /  TBili  0.5  /  DBili  x   /  AST  21  /  ALT  20  /  AlkPhos  80  10-24  PT/INR - ( 23 Oct 2022 05:30 )   PT: 20.0 sec;   INR: 1.67 ratio         PTT - ( 23 Oct 2022 05:30 )  PTT:34.2 sec    RADIOLOGY & ADDITIONAL STUDIES:   < from: CT Abdomen and Pelvis w/ IV Cont (10.23.22 @ 08:05) >  IMPRESSION:  Large lower lobe pulmonary emboli, as described above. Follow-up   dedicated CTA of the chest obtained for further evaluation if clinically   warranted.  Large amount stool throughout the colon and within the rectum with marked   distention of the cecum which measures up to 9.9 cm. Distended,   air-filled mid to distal small bowel loop with regions of marked wall   thickening in the right lower quadrant. Enteritis due to inflammatory,   ischemic or infectious etiologies should be considered. Please note that   evaluation of the bowel is somewhat limited due to lack of oral contrast.  Critical findings were discussed with Dr. Silva on 10/23/2022 8:35 AM.  Intra and extra hepatic biliary ductal dilatation as well as prominence   of the pancreatic duct in the region of the head. Correlate with   nonemergent MRI/MRCP if clinically warranted.  Additional findings as above.  < end of copied text >    < from: Xray Chest 1 View- PORTABLE-Urgent (Xray Chest 1 View- PORTABLE-Urgent .) (10.23.22 @ 06:17) >  Impression:  No acute pulmonary disease.  --- End of Report ---  < end of copied text >    PROTEIN CALORIE MALNUTRITION PRESENT: [x ] Yes [ ] No  [x ] PPSV2 < or = to 30% [ ] significant weight loss  [x ] poor nutritional intake [ ] catabolic state [ ] anasarca     Artificial Nutrition [ ]     REFERRALS:   [ ]Chaplaincy  [ ] Hospice  [ ]Child Life  [ ]Social Work  [ ]Case management [ ]Holistic Therapy     Goals of Care Document:

## 2022-10-24 NOTE — PATIENT PROFILE ADULT - NSPROGENOTHERPROVIDER_GEN_A_NUR
Problem: Adult Inpatient Plan of Care  Goal: Plan of Care Review  Outcome: Ongoing, Progressing  Goal: Patient-Specific Goal (Individualized)  Outcome: Ongoing, Progressing  Goal: Absence of Hospital-Acquired Illness or Injury  Outcome: Ongoing, Progressing  Goal: Optimal Comfort and Wellbeing  Outcome: Ongoing, Progressing  Goal: Readiness for Transition of Care  Outcome: Ongoing, Progressing     Problem: Fluid and Electrolyte Imbalance (Acute Kidney Injury/Impairment)  Goal: Fluid and Electrolyte Balance  Outcome: Ongoing, Progressing     Problem: Oral Intake Inadequate (Acute Kidney Injury/Impairment)  Goal: Optimal Nutrition Intake  Outcome: Ongoing, Progressing     Problem: Renal Function Impairment (Acute Kidney Injury/Impairment)  Goal: Effective Renal Function  Outcome: Ongoing, Progressing     Problem: Fall Injury Risk  Goal: Absence of Fall and Fall-Related Injury  Outcome: Ongoing, Progressing     Problem: Skin Injury Risk Increased  Goal: Skin Health and Integrity  Outcome: Ongoing, Progressing     Problem: Adjustment to Illness (Gastrointestinal Bleeding)  Goal: Optimal Coping with Acute Illness  Outcome: Ongoing, Progressing     Problem: Bleeding (Gastrointestinal Bleeding)  Goal: Hemostasis  Outcome: Ongoing, Progressing     Problem: Dysrhythmia  Goal: Normalized Cardiac Rhythm  Outcome: Ongoing, Progressing     Problem: Heart Failure Comorbidity  Goal: Maintenance of Heart Failure Symptom Control  Outcome: Ongoing, Progressing      none

## 2022-10-25 NOTE — DISCHARGE NOTE FOR THE EXPIRED PATIENT - HOSPITAL COURSE
·  Problem: Adult failure to thrive.   81 y/o F presenting from [facility] unresponsive. Pt  hypotensive upon arrival.  with  vaginal bleed  and  PE  on  CT . Patient cachectic has  been  on  remeron and  marinol  ant  [facility] with poor  results  Pt   initially full code but appears to be at end stage of life and critically ill. Poor prognosis likely. ER MD Discussed with daughter and son on phone, request to keep patient comfortable at this time, DNR DNI  instituted they will come see patient.  patient   DNR DNI can  go  to  regular  med  surg floor  with  comfort  care   discussed  with  pt's  niece  at  bedside    ·  Plan: comfort  care.    ·  Problem: Pulmonary embolism.   ·  Plan: O2  supplementatin.      ·  Problem: Vaginal bleeding.   ·  Plan: IVF monitor  off  AC.  Called to pronounce pt. with cardiopulmonary arrest secondary to -VAGINAL BLEEDING; ENTERITIS; PULMONARY EMBOLISM  .  Pt. seen and evaluated at bedside.  Physical exam shows pt. in bed, unresponsive to verbal and tactile stimuli.  Skin pale, heart sounds absent, no spontaneous respirations, no palpable blood pressure or pulse, pupils fixed and dilated.  Pronounced at 0450  .  Family--Erika Nieto notified. autopsy no Medical examiner no Attending  will be notified.

## 2022-11-01 DIAGNOSIS — E43 UNSPECIFIED SEVERE PROTEIN-CALORIE MALNUTRITION: ICD-10-CM

## 2022-11-01 DIAGNOSIS — N93.9 ABNORMAL UTERINE AND VAGINAL BLEEDING, UNSPECIFIED: ICD-10-CM

## 2022-11-01 DIAGNOSIS — I95.9 HYPOTENSION, UNSPECIFIED: ICD-10-CM

## 2022-11-01 DIAGNOSIS — R62.7 ADULT FAILURE TO THRIVE: ICD-10-CM

## 2022-11-01 DIAGNOSIS — K59.00 CONSTIPATION, UNSPECIFIED: ICD-10-CM

## 2022-11-01 DIAGNOSIS — R64 CACHEXIA: ICD-10-CM

## 2022-11-01 DIAGNOSIS — Z79.01 LONG TERM (CURRENT) USE OF ANTICOAGULANTS: ICD-10-CM

## 2022-11-01 DIAGNOSIS — I26.99 OTHER PULMONARY EMBOLISM WITHOUT ACUTE COR PULMONALE: ICD-10-CM

## 2022-11-01 DIAGNOSIS — Z51.5 ENCOUNTER FOR PALLIATIVE CARE: ICD-10-CM

## 2022-11-01 DIAGNOSIS — K52.9 NONINFECTIVE GASTROENTERITIS AND COLITIS, UNSPECIFIED: ICD-10-CM

## 2022-11-01 DIAGNOSIS — I10 ESSENTIAL (PRIMARY) HYPERTENSION: ICD-10-CM

## 2022-11-01 DIAGNOSIS — Z86.718 PERSONAL HISTORY OF OTHER VENOUS THROMBOSIS AND EMBOLISM: ICD-10-CM

## 2022-11-01 DIAGNOSIS — Z66 DO NOT RESUSCITATE: ICD-10-CM

## 2022-11-01 DIAGNOSIS — R53.81 OTHER MALAISE: ICD-10-CM

## 2023-04-26 NOTE — CONSULT NOTE ADULT - NSGCTIMESPENT_GEN_ALL_CORE
Patient questioned and confirmed correct dose . 10 mg 9/8 patient reports.  Also stated he has missed doses about two days .  Asked patient if he could recall days ? Stated no I have been busy in meetings . Patient replied no to every question . No other changes reported.  
BRBPR (bright red blood per rectum)
20

## 2023-09-19 NOTE — PATIENT PROFILE ADULT - LIVING ENVIRONMENT
Hold treatment today 2/2 pt not feeling well  Pt does need 20 mEq PO potassium today  Get MRI ASAP as having some post CVA symptoms for a week  RTC Monday with labs with plans of resuming txt
no

## 2023-10-12 NOTE — DISCHARGE NOTE FOR THE EXPIRED PATIENT - NS EXPIRED ORGANCONFIRMRES
Yes Mustarde Flap Text: The defect edges were debeveled with a #15 scalpel blade.  Given the size, depth and location of the defect and the proximity to free margins a Mustarde flap was deemed most appropriate. Using a sterile surgical marker, an appropriate flap was drawn incorporating the defect. The area thus outlined was incised with a #15 scalpel blade. The skin margins were undermined to an appropriate distance in all directions utilizing iris scissors. Following this, the designed flap was carried into the primary defect and sutured into place.

## 2023-11-08 NOTE — PATIENT PROFILE ADULT - NSTOBACCONEVERSMOKERY/N_GEN_A
Addended by: SIM GLOVER on: 11/7/2023 04:48 PM     Modules accepted: Orders    
Addended by: SIM GLOVER on: 11/8/2023 08:22 AM     Modules accepted: Orders    
No

## 2023-11-21 NOTE — CONSULT NOTE ADULT - NS_MD_PANP_GEN_ALL_CORE
"[facility] Medicine  Progress Note    Patient Name: Sandee Evans  MRN: [id number]  Patient Class: IP- Inpatient   Admission Date: 11/13/2023  Length of Stay: 8 days  Attending Physician: Leroy Hua DO  Primary Care Provider: Kuldeep Miller MD        Subjective:     Principal Problem:Acute respiratory failure with hypoxia and hypercapnia        HPI:   67 y.o. female, with a past medical history of A-fib on OAC,diastolic  CHF, COPD, HLD, HTN, hypothyroidism, morbid obesity, pre-diabetes, and psychiatric problems, who presents to the ED with SOB onset 3 weeks ago. She reports SOB has gotten progressively worse over 3 weeks. Patient notes she has trouble ambulating d/t SOB. Patient notes being on an at-home BiPAP but denies daily use d/t the machine "smothering" her. Additional history provided by independent historian, relative, notes an O2 saturation in the 80's at-home PTA. Patient denies O2 at home. Patient notes an associated cough, congestion, diarrhea, rhinorrhea, sore throat, and headache.ABG in ER show hypercapnic,hypoxic  respiratory failure with PH of 7.2 and P C O2 of 72,PO 2 of 76,chest x ray show fluid overload,BNP is over 478,patient has been  started on continues Bipap and IV lasix,,admitted to ICU for close monitoring.    Overview/Hospital Course:  67 y.o. female, with a past medical history of A-fib on OAC,diastolic  CHF, COPD, HLD, HTN, hypothyroidism, morbid obesity admitted on 11/22/2023 for acute respiratory failure with hypoxia and hypercapnic (O2 sts 88% on RA,  PCO2 of 72) due to +RSV, COPD exacerbation, and acute on chronic diastolic heart failure. admitted to ICU on BIPAP. BNP > 400.  Chest imaging with bilateral effusions and peripheral edema. Hospital course complicated by agitation, required Precedex drip briefly.  Able to wean off Precedex drip. Pulmonology consulted-suspect chronic hypercapnic respiratory failure but not using nightly NIV.  RSV positive " and on droplet precaution.  Patient completed treatment with DuoNebs and prednisone on 11/17/2023.  Continue IV diuresis and wean O2 as tolerated.    Interval History:  No acute overnight events.  Patient remained afebrile.  Feeling better overall.  Shortness of breath still present, but improving.  Feeling fatigued.  Tolerating diet without any difficulty.  Ate 100% of her breakfast this morning.  Denies any wheezing    Review of Systems   Constitutional:  Positive for activity change, appetite change (improving) and fatigue. Negative for chills and fever.   Respiratory:  Positive for shortness of breath (improving).    Cardiovascular:  Negative for chest pain and leg swelling.   Gastrointestinal:  Negative for abdominal pain, nausea and vomiting.   Skin:  Negative for color change.   Neurological:  Positive for weakness. Negative for dizziness and headaches.   Psychiatric/Behavioral:  Negative for agitation and behavioral problems.      Objective:     Vital Signs (Most Recent):  Temp: 98.4 °F (36.9 °C) (11/21/23 1126)  Pulse: 78 (11/21/23 1214)  Resp: 18 (11/21/23 1214)  BP: 134/84 (11/21/23 1126)  SpO2: 97 % (11/21/23 1214) Vital Signs (24h Range):  Temp:  [97.4 °F (36.3 °C)-98.6 °F (37 °C)] 98.4 °F (36.9 °C)  Pulse:  [] 78  Resp:  [16-21] 18  SpO2:  [93 %-99 %] 97 %  BP: (126-190)/(75-96) 134/84     Weight: 121 kg (266 lb 12.1 oz)  Body mass index is 47.25 kg/m².    Intake/Output Summary (Last 24 hours) at 11/21/2023 1543  Last data filed at 11/21/2023 1218  Gross per 24 hour   Intake 720 ml   Output 2700 ml   Net -1980 ml         Physical Exam  Vitals and nursing note reviewed.   Constitutional:       General: She is not in acute distress.     Appearance: She is morbidly obese. She is ill-appearing.   HENT:      Mouth/Throat:      Mouth: Mucous membranes are moist.   Eyes:      Extraocular Movements: Extraocular movements intact.   Cardiovascular:      Rate and Rhythm: Normal rate. Rhythm irregular.    Pulmonary:      Effort: Pulmonary effort is normal. No respiratory distress.      Breath sounds: Rhonchi present. No wheezing.      Comments: On 3L NC, no wheezing  Abdominal:      Palpations: Abdomen is soft.   Musculoskeletal:      Right lower leg: Edema present.      Left lower leg: Edema present.      Comments: +1 pitting edema in bilateral lower extremity, no calf tenderness   Skin:     General: Skin is warm and dry.   Neurological:      General: No focal deficit present.      Mental Status: She is alert and oriented to person, place, and time.   Psychiatric:         Mood and Affect: Mood normal.             Significant Labs: All pertinent labs within the past 24 hours have been reviewed.    Significant Imaging: I have reviewed all pertinent imaging results/findings within the past 24 hours.    Assessment/Plan:      * Acute respiratory failure with hypoxia and hypercapnia  Patient with Hypercapnic and Hypoxic Respiratory failure which is Acute on chronic.  she is not on home oxygen. Supplemental oxygen was provided and noted- Oxygen Concentration (%):  [40] 40    .   Signs/symptoms of respiratory failure include- tachypnea, increased work of breathing, and lethargy. Contributing diagnoses includes - COPD and Obesity Hypoventilation Labs and images were reviewed. Patient Has recent ABG, which has been reviewed. Will treat underlying causes and adjust management of respiratory failure as follows-   ER show hypercapnic,hypoxic  respiratory failure with PH of 7.2 and P C O2 of 72,PO 2 of 76,chest x ray show fluid overload,BNP is over 478,patient has been  started on continues Bipap and IV lasix,,admitted to ICU for close monitoring.    -presented w/acute respiratory failure with hypoxia and hypercapnic (O2 sts 88% on RA,  PCO2 of 72)  -Due to heart failure exacerbation, COPD exacerbation, and positive for RSV  -Required BIPAP, and was admitted to ICU  -Was on precedex drip for agitation  -VBG  show improvement in  "CO2 retention after BIPAP  -consulted pulmonology: suspect chronic hypercapnic respiratory failure but not using nightly NIV. Recommend nightly BIPAP while inpatient   -Wean O2 as tolerated  -continue on droplet precaution due to +RSV  -completed treatment course for COPD exacerbation with duonebs and prednisone     Acute on chronic diastolic CHF (congestive heart failure)  Patient is identified as having Diastolic (HFpEF) heart failure that is Acute on chronic. CHF is currently uncontrolled due to Dyspnea not returned to baseline after 1 doses of IV diuretic. Latest ECHO performed and demonstrates- Results for orders placed during the hospital encounter of 05/20/22    Echo    Interpretation Summary  · The left ventricle is normal in size with normal systolic function.  · The estimated ejection fraction is 60%.  · Normal right ventricular size with normal right ventricular systolic function.  · The estimated PA systolic pressure is 33 mmHg.  · Atrial fibrillation observed.  . Continue Beta Blocker and Furosemide and monitor clinical status closely. Monitor on telemetry. Patient is off CHF pathway.  Monitor strict Is&Os and daily weights.  Place on fluid restriction of 1.5 L. Cardiology has not been any consulted. Continue to stress to patient importance of self efficacy and  on diet for CHF. Last BNP reviewed- and noted below   No results for input(s): "BNP", "BNPTRIAGEBLO" in the last 168 hours.  .    Chronic obstructive pulmonary disease with acute exacerbation  Patient's COPD is with exacerbation noted by continued dyspnea and use of accessory muscles for breathing currently.  Patient is currently off COPD Pathway. Continue scheduled inhalers Steroids and Supplemental oxygen and monitor respiratory status closely.       -pt not on home oxygen  -completed prednisone course   -continue duonebs as needed  -Wean O2 as tolerated    Pulmonary hypertension  -Continue IV diuresis   -Mild COPD on PFTs.  Likely " Group II from chronic diastolic heart failure    HIEU treated with BiPAP  Non complaint with home Bipap.s  he was consulted on this.  She did better last night with Bipap.      Essential hypertension  Chronic, controlled.   Continue home metoprolol    Latest blood pressure and vitals reviewed-     Temp:  [97.4 °F (36.3 °C)-98.6 °F (37 °C)]   Pulse:  []   Resp:  [16-21]   BP: (126-190)/(75-96)   SpO2:  [93 %-99 %] .   Home meds for hypertension were reviewed and noted below.   Hypertension Medications               furosemide (LASIX) 40 MG tablet Take 1 tablet (40 mg total) by mouth once daily.    losartan (COZAAR) 25 MG tablet TAKE 1 TABLET(25 MG) BY MOUTH EVERY DAY    metoprolol succinate (TOPROL-XL) 100 MG 24 hr tablet Take 1 tablet (100 mg total) by mouth once daily.            While in the hospital, will manage blood pressure as follows; Continue home antihypertensive regimen    Will utilize p.r.n. blood pressure medication only if patient's blood pressure greater than 160/100 and she develops symptoms such as worsening chest pain or shortness of breath.    Paroxysmal atrial fibrillation  Patient with Persistent (7 days or more) atrial fibrillation which is controlled currently with Beta Blocker. Patient is currently in atrial fibrillation.[id number] Score: 4. HASBLED Score: 3. Anticoagulation indicated. Anticoagulation done with Xalreto .    -continue home Xarelto and metoprolol succinate   -continue to monitor on telemetry    Chronic kidney disease, stage 3a  Creatine stable for now. BMP reviewed- noted Estimated Creatinine Clearance: 51.5 mL/min (based on SCr of 1.3 mg/dL). according to latest data. Based on current GFR, CKD stage is stage 3 - GFR 30-59.  Monitor UOP and serial BMP and adjust therapy as needed. Renally dose meds. Avoid nephrotoxic medications and procedures.    PVD (peripheral vascular disease)  On medical treatments.      Pre-diabetes  Will monitor.  A1C 5.8 on 10/27/23    Severe  obesity (BMI >= 40)  Body mass index is 47.25 kg/m². Morbid obesity complicates all aspects of disease management from diagnostic modalities to treatment. Weight loss encouraged and health benefits explained to patient.         Goals of care, counseling/discussion  Advance Care Planning    Code Status  I engaged the the patient in a voluntary conversation about the patient's preferences for care  at the very end of life. The patient wishes to have CPR and other invasive treatments performed when her heart and/or breathing stops. I communicated to the patient that her wishes align with full code status. She agrees. I spent a total of 16 minutes engaging the patient in this advance care planning discussion.              VTE Risk Mitigation (From admission, onward)           Ordered     rivaroxaban tablet 20 mg  With dinner         11/13/23 1633                    Discharge Planning   JACQUELINE: 11/20/2023     Code Status: Full Code   Is the patient medically ready for discharge?:     Reason for patient still in hospital (select all that apply): Patient trending condition, Treatment, and PT / OT recommendations  Discharge Plan A: Home Health   Discharge Delays: None known at this time              Leroy Hua DO  Department of Hospital Medicine   [facility] - Telemetry     Attending and PA/NP shared services statement (NON-critical care):

## 2025-01-13 NOTE — H&P ADULT - HISTORY OF PRESENT ILLNESS
SI 	83 y/o F presenting from [facility] unresponsive. Pt  hypotensive upon arrival.  with  vaginal bleed  and  PE  on  CT . Patient cachectic has  been  on  remeron and  marinol  ant  Orza with poor  results  Pt   initially full code but appears to be at end stage of life and critically ill. Poor prognosis likely. ER MD Discussed with daughter and son on phone, request to keep patient comfortable at this time, DNR DNI  instituted they will come see patient.  patient   DNR DNI can  go  to  regular  med  surg floor  with  comfort  care   discussed  with  pt's  niece  at  bedside

## 2025-07-15 NOTE — OCCUPATIONAL THERAPY INITIAL EVALUATION ADULT - PLANNED THERAPY INTERVENTIONS, OT EVAL
[facility]     Mental Health Counseling Session Update - Protected Health Information           Date:   7-15-25           In person session with Ms. Chapa.                    Total  Time:   60 min         Progress:  Good                  /       Fair                                                                                                                       Compliant with Psych. Meds:  Yes       Therapeutic Modality:  Supportive                                                                   Factors regarding Medical treatment:   Multiple medical/ physical concerns/ limitations.                                                                                    Changes (if any) to Initial Assessment:                                                                             Treatment Modality:  Individual    Suicidal/ Homicidal Concerns:  No     Factors aiding or impeding psychotherapy progress:   came     Processed client's thoughts, feelings, and actions.  Looked at perceptions, assumptions, expectations, and realistic outcomes.  Examined thought, behavioral, emotional, and relationship patterns.  Client shared more personal history, recent connections, and new awarenesses.  Discussed triggers, behaviors, and long term affects of trauma.  Explored ideas, goals, choices, awareness, discernment, and problem-solving.  Talked about continuing to establish and maintain limits/ boundaries despite push-back.  Encouraged re-engaging in self-care, routines, some social, outside, exercise, and mindfulness.  Reflected on client's life experiences and noted progress.  Provided support and empathetic understanding.                                                       Next Session:    7-23-25     Kera Landry [facility]  Mental Health Counselor  
energy conservation techniques/ADL retraining/balance training/bed mobility training/joint mobilization/massage/motor coordination training/neuromuscular re-education/parent/caregiver training.../strengthening/transfer training